# Patient Record
Sex: FEMALE | Race: WHITE | Employment: FULL TIME | URBAN - METROPOLITAN AREA
[De-identification: names, ages, dates, MRNs, and addresses within clinical notes are randomized per-mention and may not be internally consistent; named-entity substitution may affect disease eponyms.]

---

## 2017-12-13 ENCOUNTER — APPOINTMENT (OUTPATIENT)
Dept: GENERAL RADIOLOGY | Age: 67
End: 2017-12-13
Payer: MEDICARE

## 2017-12-13 ENCOUNTER — HOSPITAL ENCOUNTER (EMERGENCY)
Age: 67
Discharge: HOME | End: 2017-12-13
Payer: MEDICARE

## 2017-12-13 VITALS
OXYGEN SATURATION: 98 % | SYSTOLIC BLOOD PRESSURE: 136 MMHG | HEIGHT: 61 IN | RESPIRATION RATE: 8 BRPM | TEMPERATURE: 97.1 F | DIASTOLIC BLOOD PRESSURE: 70 MMHG | BODY MASS INDEX: 22.28 KG/M2 | HEART RATE: 73 BPM | WEIGHT: 118 LBS

## 2017-12-13 DIAGNOSIS — S62.101A RIGHT WRIST FRACTURE, CLOSED, INITIAL ENCOUNTER: Primary | ICD-10-CM

## 2017-12-13 PROCEDURE — 73100 X-RAY EXAM OF WRIST: CPT

## 2017-12-13 PROCEDURE — 99283 EMERGENCY DEPT VISIT LOW MDM: CPT

## 2017-12-13 PROCEDURE — 73130 X-RAY EXAM OF HAND: CPT

## 2017-12-13 PROCEDURE — 6370000000 HC RX 637 (ALT 250 FOR IP): Performed by: PHYSICIAN ASSISTANT

## 2017-12-13 PROCEDURE — 29125 APPL SHORT ARM SPLINT STATIC: CPT

## 2017-12-13 RX ORDER — CHLORZOXAZONE 500 MG/1
500 TABLET ORAL 3 TIMES DAILY PRN
Qty: 20 TABLET | Refills: 0 | Status: SHIPPED | OUTPATIENT
Start: 2017-12-13 | End: 2017-12-13 | Stop reason: ALTCHOICE

## 2017-12-13 RX ORDER — ACETAMINOPHEN 325 MG/1
650 TABLET ORAL ONCE
Status: COMPLETED | OUTPATIENT
Start: 2017-12-13 | End: 2017-12-13

## 2017-12-13 RX ORDER — PREDNISONE 10 MG/1
10 TABLET ORAL DAILY
Qty: 21 TABLET | Refills: 0 | Status: SHIPPED | OUTPATIENT
Start: 2017-12-13 | End: 2017-12-13 | Stop reason: ALTCHOICE

## 2017-12-13 RX ORDER — HYDROCODONE BITARTRATE AND ACETAMINOPHEN 5; 325 MG/1; MG/1
1 TABLET ORAL EVERY 8 HOURS PRN
Qty: 13 TABLET | Refills: 0 | Status: SHIPPED | OUTPATIENT
Start: 2017-12-13 | End: 2017-12-20

## 2017-12-13 RX ADMIN — ACETAMINOPHEN 650 MG: 325 TABLET ORAL at 14:52

## 2017-12-13 ASSESSMENT — PAIN DESCRIPTION - LOCATION: LOCATION: WRIST

## 2017-12-13 ASSESSMENT — PAIN DESCRIPTION - ORIENTATION: ORIENTATION: RIGHT

## 2017-12-13 ASSESSMENT — ENCOUNTER SYMPTOMS
GASTROINTESTINAL NEGATIVE: 1
RESPIRATORY NEGATIVE: 1
EYES NEGATIVE: 1

## 2017-12-13 ASSESSMENT — PAIN SCALES - GENERAL
PAINLEVEL_OUTOF10: 5
PAINLEVEL_OUTOF10: 5

## 2017-12-13 NOTE — ED PROVIDER NOTES
[facility] ED  eMERGENCY dEPARTMENT eNCOUnter      Pt Name: Darius Pozo  MRN: [id number]  [place] 1950  Date of evaluation: 12/13/2017  Provider: Mukesh Julio PA-C      HISTORY OF PRESENT ILLNESS    Darius Pozo is a 79 y.o. female who presents to the Emergency Department with Chief complaint of right wrist pain. Patient states she was hanging up [place] decorations and fell landing on her right wrist.  Patient states it just happened prior to arrival.  She has not taken any medication prior to arrival.  She denies any head or neck injury and has no other complaints at this time. She denies any hip or low back pain. REVIEW OF SYSTEMS       Review of Systems   Constitutional: Negative. HENT: Negative. Eyes: Negative. Respiratory: Negative. Cardiovascular: Negative. Gastrointestinal: Negative. Endocrine: Negative. Genitourinary: Negative. Musculoskeletal: Positive for arthralgias. Right hand and right wrist   Skin: Negative. Neurological: Negative. Psychiatric/Behavioral: Negative. PAST MEDICAL HISTORY   History reviewed. No pertinent past medical history. SURGICAL HISTORY     History reviewed. No pertinent surgical history. CURRENT MEDICATIONS       Previous Medications    No medications on file       ALLERGIES     Aspirin    FAMILY HISTORY     History reviewed. No pertinent family history.        SOCIAL HISTORY       Social History     Social History    Marital status:      Spouse name: N/A    Number of children: N/A    Years of education: N/A     Social History Main Topics    Smoking status: Never Smoker    Smokeless tobacco: None    Alcohol use Yes      Comment: occasional    Drug use: No    Sexual activity: Not Asked     Other Topics Concern    None     Social History Narrative    None       SCREENINGS             PHYSICAL EXAM    (up to 7 for level 4, 8 or more for level 5)     ED Triage Vitals

## 2017-12-13 NOTE — ED TRIAGE NOTES
Pt fell off a step ladder and injured right wrist.  Swelling and deformity noted on arrival. Skin cool but color wnl

## 2018-01-08 ENCOUNTER — ANESTHESIA (OUTPATIENT)
Dept: OPERATING ROOM | Age: 68
End: 2018-01-08
Payer: MEDICARE

## 2018-01-08 ENCOUNTER — HOSPITAL ENCOUNTER (OUTPATIENT)
Age: 68
Setting detail: OUTPATIENT SURGERY
Discharge: HOME | End: 2018-01-08
Attending: ORTHOPAEDIC SURGERY | Admitting: ORTHOPAEDIC SURGERY
Payer: MEDICARE

## 2018-01-08 ENCOUNTER — ANESTHESIA EVENT (OUTPATIENT)
Dept: OPERATING ROOM | Age: 68
End: 2018-01-08
Payer: MEDICARE

## 2018-01-08 ENCOUNTER — HOSPITAL ENCOUNTER (OUTPATIENT)
Dept: GENERAL RADIOLOGY | Age: 68
Setting detail: OUTPATIENT SURGERY
Discharge: HOME | End: 2018-01-08
Attending: ORTHOPAEDIC SURGERY
Payer: MEDICARE

## 2018-01-08 VITALS
WEIGHT: 120 LBS | DIASTOLIC BLOOD PRESSURE: 66 MMHG | BODY MASS INDEX: 23.56 KG/M2 | OXYGEN SATURATION: 99 % | TEMPERATURE: 97.6 F | SYSTOLIC BLOOD PRESSURE: 100 MMHG | HEART RATE: 66 BPM | RESPIRATION RATE: 16 BRPM | HEIGHT: 60 IN

## 2018-01-08 VITALS — OXYGEN SATURATION: 100 % | DIASTOLIC BLOOD PRESSURE: 93 MMHG | SYSTOLIC BLOOD PRESSURE: 165 MMHG

## 2018-01-08 DIAGNOSIS — R52 PAIN: ICD-10-CM

## 2018-01-08 DIAGNOSIS — S52.571P OTHER CLOSED INTRA-ARTICULAR FRACTURE OF DISTAL END OF RIGHT RADIUS WITH MALUNION, SUBSEQUENT ENCOUNTER: Primary | ICD-10-CM

## 2018-01-08 PROBLEM — S52.501P CLOSED FRACTURE OF DISTAL END OF RIGHT RADIUS WITH MALUNION: Status: ACTIVE | Noted: 2018-01-08

## 2018-01-08 LAB
BASOPHILS ABSOLUTE: 0 K/UL (ref 0–0.2)
BASOPHILS RELATIVE PERCENT: 0.7 %
EKG ATRIAL RATE: 68 BPM
EKG P AXIS: 65 DEGREES
EKG P-R INTERVAL: 150 MS
EKG Q-T INTERVAL: 402 MS
EKG QRS DURATION: 74 MS
EKG QTC CALCULATION (BAZETT): 427 MS
EKG R AXIS: 23 DEGREES
EKG T AXIS: 32 DEGREES
EKG VENTRICULAR RATE: 68 BPM
EOSINOPHILS ABSOLUTE: 0.1 K/UL (ref 0–0.7)
EOSINOPHILS RELATIVE PERCENT: 1 %
HCT VFR BLD CALC: 42.1 % (ref 37–47)
HEMOGLOBIN: 14.1 G/DL (ref 12–16)
LYMPHOCYTES ABSOLUTE: 1.9 K/UL (ref 1–4.8)
LYMPHOCYTES RELATIVE PERCENT: 32.3 %
MCH RBC QN AUTO: 32.3 PG (ref 27–31.3)
MCHC RBC AUTO-ENTMCNC: 33.6 % (ref 33–37)
MCV RBC AUTO: 96.3 FL (ref 82–100)
MONOCYTES ABSOLUTE: 0.5 K/UL (ref 0.2–0.8)
MONOCYTES RELATIVE PERCENT: 9.1 %
NEUTROPHILS ABSOLUTE: 3.4 K/UL (ref 1.4–6.5)
NEUTROPHILS RELATIVE PERCENT: 56.9 %
PDW BLD-RTO: 14.2 % (ref 11.5–14.5)
PLATELET # BLD: 272 K/UL (ref 130–400)
RBC # BLD: 4.37 M/UL (ref 4.2–5.4)
SPECIMEN STATUS: NORMAL
WBC # BLD: 5.9 K/UL (ref 4.8–10.8)

## 2018-01-08 PROCEDURE — 85025 COMPLETE CBC W/AUTO DIFF WBC: CPT

## 2018-01-08 PROCEDURE — 7100000001 HC PACU RECOVERY - ADDTL 15 MIN: Performed by: ORTHOPAEDIC SURGERY

## 2018-01-08 PROCEDURE — 3700000001 HC ADD 15 MINUTES (ANESTHESIA): Performed by: ORTHOPAEDIC SURGERY

## 2018-01-08 PROCEDURE — C1769 GUIDE WIRE: HCPCS | Performed by: ORTHOPAEDIC SURGERY

## 2018-01-08 PROCEDURE — 2580000003 HC RX 258: Performed by: ORTHOPAEDIC SURGERY

## 2018-01-08 PROCEDURE — 3600000004 HC SURGERY LEVEL 4 BASE: Performed by: ORTHOPAEDIC SURGERY

## 2018-01-08 PROCEDURE — 3209999900 FLUORO FOR SURGICAL PROCEDURES

## 2018-01-08 PROCEDURE — 93005 ELECTROCARDIOGRAM TRACING: CPT

## 2018-01-08 PROCEDURE — 64417 NJX AA&/STRD AX NERVE IMG: CPT | Performed by: STUDENT IN AN ORGANIZED HEALTH CARE EDUCATION/TRAINING PROGRAM

## 2018-01-08 PROCEDURE — 93010 ELECTROCARDIOGRAM REPORT: CPT | Performed by: INTERNAL MEDICINE

## 2018-01-08 PROCEDURE — 6360000002 HC RX W HCPCS: Performed by: STUDENT IN AN ORGANIZED HEALTH CARE EDUCATION/TRAINING PROGRAM

## 2018-01-08 PROCEDURE — 7100000010 HC PHASE II RECOVERY - FIRST 15 MIN: Performed by: ORTHOPAEDIC SURGERY

## 2018-01-08 PROCEDURE — 2580000003 HC RX 258: Performed by: STUDENT IN AN ORGANIZED HEALTH CARE EDUCATION/TRAINING PROGRAM

## 2018-01-08 PROCEDURE — 3700000000 HC ANESTHESIA ATTENDED CARE: Performed by: ORTHOPAEDIC SURGERY

## 2018-01-08 PROCEDURE — 3600000014 HC SURGERY LEVEL 4 ADDTL 15MIN: Performed by: ORTHOPAEDIC SURGERY

## 2018-01-08 PROCEDURE — 2500000003 HC RX 250 WO HCPCS: Performed by: NURSE ANESTHETIST, CERTIFIED REGISTERED

## 2018-01-08 PROCEDURE — 7100000011 HC PHASE II RECOVERY - ADDTL 15 MIN: Performed by: ORTHOPAEDIC SURGERY

## 2018-01-08 PROCEDURE — 6360000002 HC RX W HCPCS: Performed by: ORTHOPAEDIC SURGERY

## 2018-01-08 PROCEDURE — 7100000000 HC PACU RECOVERY - FIRST 15 MIN: Performed by: ORTHOPAEDIC SURGERY

## 2018-01-08 PROCEDURE — 2720000010 HC SURG SUPPLY STERILE: Performed by: ORTHOPAEDIC SURGERY

## 2018-01-08 PROCEDURE — C1713 ANCHOR/SCREW BN/BN,TIS/BN: HCPCS | Performed by: ORTHOPAEDIC SURGERY

## 2018-01-08 PROCEDURE — 6360000002 HC RX W HCPCS: Performed by: NURSE ANESTHETIST, CERTIFIED REGISTERED

## 2018-01-08 DEVICE — 2.3MM X 16MM LOCKING CORTICAL SCREW
Type: IMPLANTABLE DEVICE | Site: WRIST | Status: FUNCTIONAL
Brand: ACUMED

## 2018-01-08 DEVICE — GRAFT BNE SUB 5ML 1.7-10MM CANC CHIP MORSELIZED FRZ DRY: Type: IMPLANTABLE DEVICE | Site: WRIST | Status: FUNCTIONAL

## 2018-01-08 DEVICE — 3.5MM X 14MM LOCKING HEXALOBE SCREW
Type: IMPLANTABLE DEVICE | Site: WRIST | Status: FUNCTIONAL
Brand: ACUMED

## 2018-01-08 DEVICE — 2.3MM X 18MM LOCKING CORTICAL SCREW
Type: IMPLANTABLE DEVICE | Site: WRIST | Status: FUNCTIONAL
Brand: ACUMED

## 2018-01-08 RX ORDER — DIPHENHYDRAMINE HYDROCHLORIDE 50 MG/ML
12.5 INJECTION INTRAMUSCULAR; INTRAVENOUS
Status: DISCONTINUED | OUTPATIENT
Start: 2018-01-08 | End: 2018-01-08 | Stop reason: HOSPADM

## 2018-01-08 RX ORDER — ONDANSETRON 2 MG/ML
INJECTION INTRAMUSCULAR; INTRAVENOUS PRN
Status: DISCONTINUED | OUTPATIENT
Start: 2018-01-08 | End: 2018-01-08 | Stop reason: SDUPTHER

## 2018-01-08 RX ORDER — FENTANYL CITRATE 50 UG/ML
50 INJECTION, SOLUTION INTRAMUSCULAR; INTRAVENOUS EVERY 10 MIN PRN
Status: DISCONTINUED | OUTPATIENT
Start: 2018-01-08 | End: 2018-01-08 | Stop reason: HOSPADM

## 2018-01-08 RX ORDER — LIDOCAINE HYDROCHLORIDE 10 MG/ML
1 INJECTION, SOLUTION EPIDURAL; INFILTRATION; INTRACAUDAL; PERINEURAL ONCE
Status: DISCONTINUED | OUTPATIENT
Start: 2018-01-08 | End: 2018-01-08 | Stop reason: HOSPADM

## 2018-01-08 RX ORDER — ROPIVACAINE HYDROCHLORIDE 5 MG/ML
INJECTION, SOLUTION EPIDURAL; INFILTRATION; PERINEURAL PRN
Status: DISCONTINUED | OUTPATIENT
Start: 2018-01-08 | End: 2018-01-08 | Stop reason: SDUPTHER

## 2018-01-08 RX ORDER — ONDANSETRON 2 MG/ML
4 INJECTION INTRAMUSCULAR; INTRAVENOUS
Status: DISCONTINUED | OUTPATIENT
Start: 2018-01-08 | End: 2018-01-08 | Stop reason: HOSPADM

## 2018-01-08 RX ORDER — HYDROCODONE BITARTRATE AND ACETAMINOPHEN 5; 325 MG/1; MG/1
1 TABLET ORAL EVERY 6 HOURS PRN
Qty: 20 TABLET | Refills: 0 | Status: SHIPPED | OUTPATIENT
Start: 2018-01-08 | End: 2018-01-13

## 2018-01-08 RX ORDER — DEXAMETHASONE SODIUM PHOSPHATE 10 MG/ML
INJECTION INTRAMUSCULAR; INTRAVENOUS PRN
Status: DISCONTINUED | OUTPATIENT
Start: 2018-01-08 | End: 2018-01-08 | Stop reason: SDUPTHER

## 2018-01-08 RX ORDER — MIDAZOLAM HYDROCHLORIDE 1 MG/ML
INJECTION INTRAMUSCULAR; INTRAVENOUS PRN
Status: DISCONTINUED | OUTPATIENT
Start: 2018-01-08 | End: 2018-01-08 | Stop reason: SDUPTHER

## 2018-01-08 RX ORDER — PROPOFOL 10 MG/ML
INJECTION, EMULSION INTRAVENOUS PRN
Status: DISCONTINUED | OUTPATIENT
Start: 2018-01-08 | End: 2018-01-08 | Stop reason: SDUPTHER

## 2018-01-08 RX ORDER — HYDROCODONE BITARTRATE AND ACETAMINOPHEN 5; 325 MG/1; MG/1
1 TABLET ORAL PRN
Status: DISCONTINUED | OUTPATIENT
Start: 2018-01-08 | End: 2018-01-08 | Stop reason: HOSPADM

## 2018-01-08 RX ORDER — LIDOCAINE HYDROCHLORIDE 20 MG/ML
INJECTION, SOLUTION INFILTRATION; PERINEURAL PRN
Status: DISCONTINUED | OUTPATIENT
Start: 2018-01-08 | End: 2018-01-08 | Stop reason: SDUPTHER

## 2018-01-08 RX ORDER — METOCLOPRAMIDE HYDROCHLORIDE 5 MG/ML
10 INJECTION INTRAMUSCULAR; INTRAVENOUS
Status: DISCONTINUED | OUTPATIENT
Start: 2018-01-08 | End: 2018-01-08 | Stop reason: HOSPADM

## 2018-01-08 RX ORDER — MEPERIDINE HYDROCHLORIDE 25 MG/ML
12.5 INJECTION INTRAMUSCULAR; INTRAVENOUS; SUBCUTANEOUS EVERY 5 MIN PRN
Status: DISCONTINUED | OUTPATIENT
Start: 2018-01-08 | End: 2018-01-08 | Stop reason: HOSPADM

## 2018-01-08 RX ORDER — SODIUM CHLORIDE, SODIUM LACTATE, POTASSIUM CHLORIDE, CALCIUM CHLORIDE 600; 310; 30; 20 MG/100ML; MG/100ML; MG/100ML; MG/100ML
INJECTION, SOLUTION INTRAVENOUS CONTINUOUS
Status: DISCONTINUED | OUTPATIENT
Start: 2018-01-08 | End: 2018-01-08 | Stop reason: HOSPADM

## 2018-01-08 RX ORDER — HYDROCODONE BITARTRATE AND ACETAMINOPHEN 5; 325 MG/1; MG/1
2 TABLET ORAL PRN
Status: DISCONTINUED | OUTPATIENT
Start: 2018-01-08 | End: 2018-01-08 | Stop reason: HOSPADM

## 2018-01-08 RX ORDER — MAGNESIUM HYDROXIDE 1200 MG/15ML
LIQUID ORAL CONTINUOUS PRN
Status: DISCONTINUED | OUTPATIENT
Start: 2018-01-08 | End: 2018-01-08 | Stop reason: HOSPADM

## 2018-01-08 RX ORDER — GLYCOPYRROLATE 0.2 MG/ML
INJECTION INTRAMUSCULAR; INTRAVENOUS PRN
Status: DISCONTINUED | OUTPATIENT
Start: 2018-01-08 | End: 2018-01-08 | Stop reason: SDUPTHER

## 2018-01-08 RX ADMIN — PHENYLEPHRINE HYDROCHLORIDE 100 MCG: 10 INJECTION INTRAVENOUS at 11:08

## 2018-01-08 RX ADMIN — PHENYLEPHRINE HYDROCHLORIDE 100 MCG: 10 INJECTION INTRAVENOUS at 11:15

## 2018-01-08 RX ADMIN — DEXAMETHASONE SODIUM PHOSPHATE 10 MG: 10 INJECTION INTRAMUSCULAR; INTRAVENOUS at 10:38

## 2018-01-08 RX ADMIN — ONDANSETRON 4 MG: 2 INJECTION INTRAMUSCULAR; INTRAVENOUS at 10:38

## 2018-01-08 RX ADMIN — PHENYLEPHRINE HYDROCHLORIDE 100 MCG: 10 INJECTION INTRAVENOUS at 10:55

## 2018-01-08 RX ADMIN — PHENYLEPHRINE HYDROCHLORIDE 100 MCG: 10 INJECTION INTRAVENOUS at 11:25

## 2018-01-08 RX ADMIN — PHENYLEPHRINE HYDROCHLORIDE 100 MCG: 10 INJECTION INTRAVENOUS at 12:35

## 2018-01-08 RX ADMIN — CEFAZOLIN SODIUM 2 G: 2 SOLUTION INTRAVENOUS at 10:34

## 2018-01-08 RX ADMIN — PHENYLEPHRINE HYDROCHLORIDE 100 MCG: 10 INJECTION INTRAVENOUS at 11:45

## 2018-01-08 RX ADMIN — PHENYLEPHRINE HYDROCHLORIDE 100 MCG: 10 INJECTION INTRAVENOUS at 11:56

## 2018-01-08 RX ADMIN — PHENYLEPHRINE HYDROCHLORIDE 100 MCG: 10 INJECTION INTRAVENOUS at 13:25

## 2018-01-08 RX ADMIN — PROPOFOL 100 MG: 10 INJECTION, EMULSION INTRAVENOUS at 10:24

## 2018-01-08 RX ADMIN — LIDOCAINE HYDROCHLORIDE 50 MG: 20 INJECTION, SOLUTION INFILTRATION; PERINEURAL at 10:24

## 2018-01-08 RX ADMIN — PHENYLEPHRINE HYDROCHLORIDE 100 MCG: 10 INJECTION INTRAVENOUS at 12:10

## 2018-01-08 RX ADMIN — ROPIVACAINE HYDROCHLORIDE 30 ML: 5 INJECTION, SOLUTION EPIDURAL; INFILTRATION; PERINEURAL at 09:17

## 2018-01-08 RX ADMIN — PHENYLEPHRINE HYDROCHLORIDE 100 MCG: 10 INJECTION INTRAVENOUS at 12:55

## 2018-01-08 RX ADMIN — GLYCOPYRROLATE 0.2 MG: 0.2 INJECTION INTRAMUSCULAR; INTRAVENOUS at 10:53

## 2018-01-08 RX ADMIN — PHENYLEPHRINE HYDROCHLORIDE 100 MCG: 10 INJECTION INTRAVENOUS at 12:25

## 2018-01-08 RX ADMIN — SODIUM CHLORIDE, POTASSIUM CHLORIDE, SODIUM LACTATE AND CALCIUM CHLORIDE: 600; 310; 30; 20 INJECTION, SOLUTION INTRAVENOUS at 10:23

## 2018-01-08 RX ADMIN — MIDAZOLAM HYDROCHLORIDE 5 MG: 1 INJECTION, SOLUTION INTRAMUSCULAR; INTRAVENOUS at 09:13

## 2018-01-08 ASSESSMENT — PULMONARY FUNCTION TESTS
PIF_VALUE: 14
PIF_VALUE: 2
PIF_VALUE: 14
PIF_VALUE: 14
PIF_VALUE: 15
PIF_VALUE: 14
PIF_VALUE: 2
PIF_VALUE: 14
PIF_VALUE: 14
PIF_VALUE: 6
PIF_VALUE: 5
PIF_VALUE: 3
PIF_VALUE: 5
PIF_VALUE: 14
PIF_VALUE: 6
PIF_VALUE: 14
PIF_VALUE: 2
PIF_VALUE: 14
PIF_VALUE: 12
PIF_VALUE: 14
PIF_VALUE: 2
PIF_VALUE: 14
PIF_VALUE: 9
PIF_VALUE: 14
PIF_VALUE: 1
PIF_VALUE: 14
PIF_VALUE: 0
PIF_VALUE: 14
PIF_VALUE: 2
PIF_VALUE: 14
PIF_VALUE: 10
PIF_VALUE: 14
PIF_VALUE: 2
PIF_VALUE: 2
PIF_VALUE: 14
PIF_VALUE: 6
PIF_VALUE: 14
PIF_VALUE: 4
PIF_VALUE: 14
PIF_VALUE: 14
PIF_VALUE: 2
PIF_VALUE: 14
PIF_VALUE: 14
PIF_VALUE: 5
PIF_VALUE: 14
PIF_VALUE: 0
PIF_VALUE: 12
PIF_VALUE: 14
PIF_VALUE: 6
PIF_VALUE: 14
PIF_VALUE: 2
PIF_VALUE: 14
PIF_VALUE: 2
PIF_VALUE: 12
PIF_VALUE: 14
PIF_VALUE: 2
PIF_VALUE: 14
PIF_VALUE: 5
PIF_VALUE: 14
PIF_VALUE: 6
PIF_VALUE: 14
PIF_VALUE: 2
PIF_VALUE: 14
PIF_VALUE: 2
PIF_VALUE: 14
PIF_VALUE: 2
PIF_VALUE: 14
PIF_VALUE: 2
PIF_VALUE: 1
PIF_VALUE: 14
PIF_VALUE: 2
PIF_VALUE: 14
PIF_VALUE: 12
PIF_VALUE: 14
PIF_VALUE: 9
PIF_VALUE: 14
PIF_VALUE: 14
PIF_VALUE: 12
PIF_VALUE: 14
PIF_VALUE: 2
PIF_VALUE: 14
PIF_VALUE: 9
PIF_VALUE: 14
PIF_VALUE: 2
PIF_VALUE: 14

## 2018-01-08 ASSESSMENT — PAIN SCALES - GENERAL
PAINLEVEL_OUTOF10: 0
PAINLEVEL_OUTOF10: 0

## 2018-01-08 ASSESSMENT — PAIN - FUNCTIONAL ASSESSMENT: PAIN_FUNCTIONAL_ASSESSMENT: 0-10

## 2018-01-08 NOTE — PROGRESS NOTES
0900-A time out was called and Dr. Linda Michele marked the right arm and performed a block she was given a sedative per Dr. Linda Michele . Placed on the monitor and oxygen prior to the procedure.

## 2018-01-08 NOTE — ANESTHESIA PRE PROCEDURE
Encounters:   01/08/18 120 lb (54.4 kg)   12/13/17 118 lb (53.5 kg)     Body mass index is 23.44 kg/m². CBC:   Lab Results   Component Value Date    WBC 5.9 01/08/2018    RBC 4.37 01/08/2018    HGB 14.1 01/08/2018    HCT 42.1 01/08/2018    MCV 96.3 01/08/2018    RDW 14.2 01/08/2018     01/08/2018       CMP: No results found for: NA, K, CL, CO2, BUN, CREATININE, GFRAA, AGRATIO, LABGLOM, GLUCOSE, PROT, CALCIUM, BILITOT, ALKPHOS, AST, ALT    POC Tests: No results for input(s): POCGLU, POCNA, POCK, POCCL, POCBUN, POCHEMO, POCHCT in the last 72 hours. Coags: No results found for: PROTIME, INR, APTT    HCG (If Applicable): No results found for: PREGTESTUR, PREGSERUM, HCG, HCGQUANT     ABGs: No results found for: PHART, PO2ART, AIS2QDJ, TII3CVR, BEART, Z4VACUGB     Type & Screen (If Applicable):  No results found for: LABABO, [address]    Anesthesia Evaluation  Patient summary reviewed and Nursing notes reviewed no history of anesthetic complications:   Airway: Mallampati: II  TM distance: >3 FB   Neck ROM: full  Mouth opening: > = 3 FB Dental: normal exam         Pulmonary:Negative Pulmonary ROS and normal exam  breath sounds clear to auscultation                             Cardiovascular:Negative CV ROS  Exercise tolerance: good (>4 METS),         ECG reviewed  Rhythm: regular  Rate: normal           Beta Blocker:  Not on Beta Blocker      ROS comment: Normal sinus rhythm  Normal ECG  No previous ECGs available     Neuro/Psych:   Negative Neuro/Psych ROS              GI/Hepatic/Renal: Neg GI/Hepatic/Renal ROS            Endo/Other: Negative Endo/Other ROS             Pt had PAT visit. Abdominal:           Vascular:                                      Anesthesia Plan      regional and general     ASA 1     (LMA  Axillary nerve block with US)  Induction: intravenous. MIPS: Prophylactic antiemetics administered. Anesthetic plan and risks discussed with patient.       Plan discussed with

## 2018-01-08 NOTE — OP NOTE
Dissected sharply through skin and Bovie electrocautery as used for hemostasis. The EPL tendon was identified proximally and distally and the retinaculum was opened along its course. The second and fourth compartments were then elevated in a subperiosteal fashion off of the dorsolateral the distal radius. There was a significant amount of comminution as well as callus formation present. The fracture fragments were mobilized. Length and tilt appeared to be adequately restored and an appropriate plate was selected and placed. The plate was secured proximally with a cortical screw and multiple fluoroscopic views confirmed maintain reduction and appropriate hardware placement. Screws were placed distally in the ulnar and radial fragments. The radial screws really did not capture the styloid adequately and was persistently widened both visually and on x-ray. I elected to place a radial plate and this was secured into appropriate position area did cortical screw was placed proximally to bring the plate down to the bone and a locking screw was placed distally to maintain it along the styloid. Additional cortical and locking screws were placed proximally. Multiple fluoroscopic views confirmed appropriate plate position and maintained reduction. There is no crepitus with passive range of motion of the wrist.  No laxity of the distal radial ulnar joint was appreciated. The wounds were copiously irrigated with normal saline. The tourniquet was deflated and Bovie electrocautery was used for hemostasis. There was some oozing near the radial artery and pressure was held for several minutes and there was adequate hemostasis following pressure. The wound was again irrigated. The retinaculum was loosely closed with 3-0 Vicryl suture. The EPL tendon was left transposed outside of the retinaculum.   The subcutaneous tissue was closed with 2-0 Vicryl and the skin was closed with 4-0 nylon suture in an interrupted

## 2018-02-26 ENCOUNTER — HOSPITAL ENCOUNTER (OUTPATIENT)
Dept: OCCUPATIONAL THERAPY | Age: 68
Setting detail: THERAPIES SERIES
Discharge: HOME | End: 2018-02-26
Payer: MEDICARE

## 2018-02-26 PROCEDURE — G8988 SELF CARE GOAL STATUS: HCPCS

## 2018-02-26 PROCEDURE — 97165 OT EVAL LOW COMPLEX 30 MIN: CPT

## 2018-02-26 PROCEDURE — G8987 SELF CARE CURRENT STATUS: HCPCS

## 2018-02-26 PROCEDURE — 97530 THERAPEUTIC ACTIVITIES: CPT

## 2018-02-28 ENCOUNTER — HOSPITAL ENCOUNTER (OUTPATIENT)
Dept: OCCUPATIONAL THERAPY | Age: 68
Setting detail: THERAPIES SERIES
Discharge: HOME | End: 2018-02-28
Payer: MEDICARE

## 2018-02-28 PROCEDURE — 97140 MANUAL THERAPY 1/> REGIONS: CPT

## 2018-02-28 PROCEDURE — 97530 THERAPEUTIC ACTIVITIES: CPT

## 2018-03-05 ENCOUNTER — HOSPITAL ENCOUNTER (OUTPATIENT)
Dept: OCCUPATIONAL THERAPY | Age: 68
Setting detail: THERAPIES SERIES
Discharge: HOME | End: 2018-03-05
Payer: MEDICARE

## 2018-03-05 PROCEDURE — 97140 MANUAL THERAPY 1/> REGIONS: CPT

## 2018-03-05 PROCEDURE — 97530 THERAPEUTIC ACTIVITIES: CPT

## 2018-03-07 ENCOUNTER — HOSPITAL ENCOUNTER (OUTPATIENT)
Dept: OCCUPATIONAL THERAPY | Age: 68
Setting detail: THERAPIES SERIES
Discharge: HOME | End: 2018-03-07
Payer: MEDICARE

## 2018-03-07 PROCEDURE — 97530 THERAPEUTIC ACTIVITIES: CPT

## 2018-03-07 PROCEDURE — 97140 MANUAL THERAPY 1/> REGIONS: CPT

## 2018-03-07 NOTE — PROGRESS NOTES
Occupational Therapy  Daily Treatment Note  Date: 3/7/2018  Patient Name: Lyndon Tamayo  :  1950  MRN: [id number]       Subjective   OT Visit Information  Onset Date: 17  OT Insurance Information: Medicare   Progress Note Counter: 3 after evaluation  Subjective  Subjective: Pt reports continued pain of right thumb, index and small fingers. Pt states  \"the wrist is fine, it's the thumb that's really giving me trouble\" re: pain      Treatment Activities:  Pt completed PROM to right wrist and hand during 20 minute heat treatment. Pt reports completion of recommended home exercises. Pt states' \" I'm trying to do more with it\" re: her dominant right hand. Direct treatment of manual techniques completed. Techniques included light arm pull, transverse planes and deep releases at right forearm and wrist.  PROM completed for right forearm supination and pronation. Pt denied pain at the end of treatment. Pt to add one task to existing HEP. Bilateral clasp during wrist flexion and /.  Pt demonstrated correct execution. Modalities: Yes  Fluidotherapy  Number Minutes Fluidotherapy: 20  Fluidotherapy Location: Right;Left;Hand;Wrist.  Pt completed passive, right hand and wrist movement using non dominant left hand. Hand Dominance  Hand Dominance: Right      Assessment Pt tolerated therapy interventions well. Pt reports continued pain of lateral aspect of right hand indicating level of CMC. Pt also reports discomfort of right 2nd and 5th digits. Pt reports completion of recommended home exercises. Pt continues to exhibit impaired right forearm and wrist active and passive movement. Pt unable to complete composite finger flexion with index flexion impaired. Pt reports attempting to use right hand more. Post Treatment Pain Screening  Pain at present: 0  Scale Used:  Faces  Comments / Details: OT recommends icing of right wrist and hand if pain occurs         Plan Continue with established

## 2018-03-12 ENCOUNTER — HOSPITAL ENCOUNTER (OUTPATIENT)
Dept: OCCUPATIONAL THERAPY | Age: 68
Setting detail: THERAPIES SERIES
Discharge: HOME | End: 2018-03-12
Payer: MEDICARE

## 2018-03-12 PROCEDURE — 97530 THERAPEUTIC ACTIVITIES: CPT

## 2018-03-12 NOTE — PROGRESS NOTES
remained in place until suggested day of removal (2 days). Pt denied experiencing irritation at area. Pt reports tape was \"difficult to remove\"  OT recommends saturating tape with baby oil prior to removal.  Pt reports understanding information discussed. Taping applied to lateral aspect of right wrist and hand. An I strip was applied horizontally at the level of the styloids. 25-30 % tension for I strip. A strip with Y tails applied with anchor over thumb MP (thumb MP in flexion when tape placed) 25 % stretch applied. Y tails secured at level of APL and EPB. OT reiterated precautions ie: removal of tape if any signs of irritation. Pt reports understanding information discussed. Light hand strengthening initiated today. Pt was provided light resistance (pink foam sponge) Pt tolerated 10 reps without increased pain. Pt to add to current HEP, completing 10 reps 1 set 2-3 x a day as she can tolerate. Pt to slowly add sets of 10 until 30 reps total completed. Pt reported 2-3/10 pain at the end of treatment session. OT and pt discussed pt's hobbies. Pt reports she has completed yolande in the past.  OT recommends attempting an easy pattern or stitch for short periods of time. Pt receptive to recommendation. Other exercises  Other exercises?: Yes  Other exercises 1: AROM wire 4 x over and back   Other exercises  2: cutting of paper with regular and spring loaded scissors.  10 x    RUE AROM (degrees)  RUE AROM : Exceptions  R Forearm Pron 0-90: 0-90  R Forearm Supination  0-90: 0-45  R Wrist Flexion 0-80: 0-18  R Wrist Extension 0-70: 0-22  Right Hand AROM (degrees)  Right Hand AROM: Exceptions  R Index  MCP 0-90: -5 to 80  R Index PIP 0-100: 0 to 80  R Index DIP 0-70: 0 to 50  R Long  MCP 0-90: 0 to 80  R Long PIP 0-100: -15  to 90  R Long DIP 0-70: -5  to 45  R Ring  MCP 0-90: 0 to 80  R Ring PIP 0-100: -10 to 95  R Ring DIP 0-70: 0 to 45  R Little  MCP 0-90: 0 to 80  R Little PIP 0-100: -17 to Individual Concurrent Group Co-treatment   Time In  2545         Time Out  1100         Minutes  39                 Hellen Mcintyre, OTR/L Electronically signed by Brenden Thomas OTR/L on 3/12/18 at 4:04 PM

## 2018-03-14 ENCOUNTER — HOSPITAL ENCOUNTER (OUTPATIENT)
Dept: OCCUPATIONAL THERAPY | Age: 68
Setting detail: THERAPIES SERIES
Discharge: HOME | End: 2018-03-14
Payer: MEDICARE

## 2018-03-14 PROCEDURE — 97530 THERAPEUTIC ACTIVITIES: CPT

## 2018-03-14 PROCEDURE — 97140 MANUAL THERAPY 1/> REGIONS: CPT

## 2018-03-14 NOTE — PROGRESS NOTES
Occupational Therapy  Daily Treatment Note  Date: 3/14/2018  Patient Name: Norbert Gamboa  :  1950  MRN: [id number]       Subjective   OT Visit Information  Progress Note Counter: 5 after evaluation  Subjective  Subjective: Pt reports continued pain of right hand, lateral aspect  Patient Observation  Observations: Pt stated, \"I showed my  I can do this now'\" Pt indicating supination of right. Pt also reports \"I've been doing a lot of movements, like when I'm doing the dishes and scrubbing pans. \"   Treatment Activities:  Manual techniques completed with pt in supine. Techniques included transverse planes at right forearm and wrist and light distraction at forearm and wrist.  Pt's wrist passive flexion and / measured. Pt with 25 degrees passive / and 20 degrees passive flexion. OT discussed use of SuperSportt system for right wrist movement. Pt reports she has an appointment with Dr Danyelle Whitfield on . OT to provide request for prescription form to pt at next visit. Pt participated in therapeutic exercise with right hand and wrist.  BTE initiated today. Pt denied pain at the end of treatment. Pt reports concern regarding decreased thumb extension and abduction. Pt exhibits what appears to be atrophy of her thenar eminence. OT provided 2 additional tasks for exercise sponge use. Other exercises  Other exercises?: Yes  Other exercises 1: AROM wire 3x over and back   Other exercises 2:  3rd handle, no resist, 10x  Other exercises 3:  10 x no added resistance   Other exercises 4:  10 x no added resistance   Other exercises 5: 10x light resistance sponge tasks, squeezing, pinching and pushing into sponge with thumb           Assessment Pt continues to exhibit impaired wrist active and passive movement. OT discussed use of a dynamic splint to increase right wrist flexion/extension.   Pt reports she has an appointment with the orthopedic MD next week and she will discuss

## 2018-03-19 ENCOUNTER — HOSPITAL ENCOUNTER (OUTPATIENT)
Dept: OCCUPATIONAL THERAPY | Age: 68
Setting detail: THERAPIES SERIES
Discharge: HOME | End: 2018-03-19
Payer: MEDICARE

## 2018-03-19 PROCEDURE — 97140 MANUAL THERAPY 1/> REGIONS: CPT

## 2018-03-19 PROCEDURE — 97530 THERAPEUTIC ACTIVITIES: CPT

## 2018-03-21 ENCOUNTER — HOSPITAL ENCOUNTER (OUTPATIENT)
Dept: OCCUPATIONAL THERAPY | Age: 68
Setting detail: THERAPIES SERIES
Discharge: HOME | End: 2018-03-21
Payer: MEDICARE

## 2018-03-21 PROCEDURE — 97530 THERAPEUTIC ACTIVITIES: CPT

## 2018-03-21 NOTE — PROGRESS NOTES
with 5#  vs. 4# on 3/12/18. OT reviewed use of Dynasplint to stretch tissue and increase motion, specifically wrist /.  Pt declined pursuit of a splint system at this time. Pt participated in therapeutic tasks to increase AROM of right distal UE. Other exercises  Other exercises?: Yes  Other exercises 1: AROM ROM wire 3x over and back   Other exercises 2: lateral pinch with resistance 1# therapy pin 5x on/off target   Other exercises 3: shabazz pinch 20 x use of 1# resistive pin to prehend small 1/2 \" diameter ball   Other exercises 4: squeezing of large and small, light resistance syringes with H2O   Other exercises 5: passive stretch placement of hand on table in standing moving body over limb for wrist /.  Other exercises 6: wrist roll exerciser no added resistance 5x up and down  Other exercises 7: squeezing of H2O from small sponges 15x using lateral pinch   Other exercises 8: nut roll on vertical bolt using pad to pad pinch            Hand Dominance  Hand Dominance: Right  Right Hand Strength -  (lbs)  Handle Setting 2: 5#     Assessment Pt continues to report discomfort/pain experienced on lateral side of right arm when grasping items. Pt states she continues to attempt to use her hand for activity \"challenging\" herself to complete items like squeezing a bottle or opening a door with the knob. 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 Pt demonstrates functional finger flexion, but  remains poor. Post Treatment Pain Screening  Pain at present: 2  Scale Used: Faces         Plan Continue with established treatment plan.                   Goals addressed- ALL  Long term goals  Time Frame for Long term goals : Pt will demonstrate 45 degrees right wrist flexion and /.   Long term goal 1: Pt will exhibit full right forearm supination and 4/5 grade strength  Long term goal 2: Pt will demonstrate WNLs right digit AROM all movements   Long term goal 3: Pt will attain right  pf 25-30# and pinch of 5-8#  Long term goal 4: Pt will exhibit WNLs dexterity/coordination as evidenced by improved scores on formal testing and pt report of effective manipulation of small items   Long term goal 5: Pt will participate in daily activities without report of right distal UE pain  Long term goals 6: Pt will demonstrate good understanding of all recommended home programs after instruction complete     Therapy Time   Individual Concurrent Group Co-treatment   Time In  1108         Time Out  1205         Minutes  62                 Hellen Montes OTR/L Electronically signed by Rosilyn Riedel, OTR/L on 3/21/18 at 1:49 PM

## 2018-03-26 ENCOUNTER — HOSPITAL ENCOUNTER (OUTPATIENT)
Dept: OCCUPATIONAL THERAPY | Age: 68
Setting detail: THERAPIES SERIES
Discharge: HOME | End: 2018-03-26
Payer: MEDICARE

## 2018-03-26 PROCEDURE — 97530 THERAPEUTIC ACTIVITIES: CPT

## 2018-03-26 NOTE — PROGRESS NOTES
Occupational Therapy  Daily Treatment Note  Date: 3/26/2018  Patient Name: Caden Garcia  :  1950  MRN: [id number]       Subjective   OT Visit Information  Onset Date: 17  OT Insurance Information: Medicare   Progress Note Counter: 8 after evaluation  Subjective  Subjective: Pt reports \"I'm working again! 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 Pt reports this is due to her 's cardiac rehab schedule. 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 Pt reports she is adding her client's in slowly. Pt states she is now able to use the curling iron, comb, cut and style hair. Pt states she has not attempted all tasks yet. Other exercises  Other exercises?: Yes  Other exercises 1: rubber band task 20 x abduction  of digits with stretching of rubberbands and placement on cylinder ( Pt to add this task to current HEP)  Other exercises 2: ab and adduction of digits in play mando  Other exercises 3: rolling and \"pressing out\" mando using 3 inch and 4 inch diameter circles        Modalities: Yes  Other: Infrared heat treatment,  6 joules along radial side of wrist and level of thumb          Assessment Pt tolerated tasks of treatment well. 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 Post Treatment Pain Screening  Pain at present: 2  Scale Used: Faces  Intervention List: Patient declined any intervention         Plan Continue with established treatment plan.              Goals addressed- ALL  Long term goals  Time Frame for Long term goals : Pt will demonstrate 45 degrees right wrist flexion and /.   Long term goal 1: Pt will exhibit full right forearm supination and 4/5 grade strength  Long term goal 2: Pt will

## 2018-03-28 ENCOUNTER — HOSPITAL ENCOUNTER (OUTPATIENT)
Dept: OCCUPATIONAL THERAPY | Age: 68
Setting detail: THERAPIES SERIES
Discharge: HOME | End: 2018-03-28
Payer: MEDICARE

## 2018-03-28 PROCEDURE — 97140 MANUAL THERAPY 1/> REGIONS: CPT

## 2018-03-28 PROCEDURE — 97022 WHIRLPOOL THERAPY: CPT

## 2018-03-28 NOTE — PROGRESS NOTES
Score                                           Goals  Long term goals 1-6 addressed.   Time Frame for Long term goals : Pt will demonstrate 45 degrees right wrist flexion and /.   Long term goal 1: Pt will exhibit full right forearm supination and 4/5 grade strength  Long term goal 2: Pt will demonstrate WNLs right digit AROM all movements   Long term goal 3: Pt will attain right  pf 25-30# and pinch of 5-8#  Long term goal 4: Pt will exhibit WNLs dexterity/coordination as evidenced by improved scores on formal testing and pt report of effective manipulation of small items   Long term goal 5: Pt will participate in daily activities without report of right distal UE pain  Long term goals 6: Pt will demonstrate good understanding of all recommended home programs after instruction complete     Therapy Time   Individual Concurrent Group Co-treatment   Time In  10:00 am         Time Out  11:00 am         Minutes  60                 PRANEETH Lopez/L  Electronically signed by STANFORD Lopez on 3/28/18 at 12:14 PM

## 2018-04-04 ENCOUNTER — HOSPITAL ENCOUNTER (OUTPATIENT)
Dept: OCCUPATIONAL THERAPY | Age: 68
Setting detail: THERAPIES SERIES
Discharge: HOME | End: 2018-04-04
Payer: MEDICARE

## 2018-04-04 PROCEDURE — 97110 THERAPEUTIC EXERCISES: CPT

## 2018-04-04 PROCEDURE — 97530 THERAPEUTIC ACTIVITIES: CPT

## 2018-04-09 ENCOUNTER — APPOINTMENT (OUTPATIENT)
Dept: OCCUPATIONAL THERAPY | Age: 68
End: 2018-04-09
Payer: MEDICARE

## 2018-04-11 ENCOUNTER — HOSPITAL ENCOUNTER (OUTPATIENT)
Dept: OCCUPATIONAL THERAPY | Age: 68
Setting detail: THERAPIES SERIES
Discharge: HOME | End: 2018-04-11
Payer: MEDICARE

## 2018-04-11 PROCEDURE — 97530 THERAPEUTIC ACTIVITIES: CPT

## 2018-04-11 PROCEDURE — G8988 SELF CARE GOAL STATUS: HCPCS

## 2018-04-11 PROCEDURE — 97110 THERAPEUTIC EXERCISES: CPT

## 2018-04-11 PROCEDURE — G8987 SELF CARE CURRENT STATUS: HCPCS

## 2018-04-16 ENCOUNTER — HOSPITAL ENCOUNTER (OUTPATIENT)
Dept: OCCUPATIONAL THERAPY | Age: 68
Setting detail: THERAPIES SERIES
Discharge: HOME | End: 2018-04-16
Payer: MEDICARE

## 2018-04-16 PROCEDURE — G8988 SELF CARE GOAL STATUS: HCPCS

## 2018-04-16 PROCEDURE — 97530 THERAPEUTIC ACTIVITIES: CPT

## 2018-04-16 PROCEDURE — G8987 SELF CARE CURRENT STATUS: HCPCS

## 2018-04-18 ENCOUNTER — HOSPITAL ENCOUNTER (OUTPATIENT)
Dept: OCCUPATIONAL THERAPY | Age: 68
Setting detail: THERAPIES SERIES
Discharge: HOME | End: 2018-04-18
Payer: MEDICARE

## 2018-04-18 PROCEDURE — 97530 THERAPEUTIC ACTIVITIES: CPT

## 2018-04-18 PROCEDURE — 97140 MANUAL THERAPY 1/> REGIONS: CPT

## 2018-04-23 ENCOUNTER — HOSPITAL ENCOUNTER (OUTPATIENT)
Dept: OCCUPATIONAL THERAPY | Age: 68
Setting detail: THERAPIES SERIES
Discharge: HOME | End: 2018-04-23
Payer: MEDICARE

## 2018-04-23 PROCEDURE — 97530 THERAPEUTIC ACTIVITIES: CPT

## 2018-04-23 PROCEDURE — 97140 MANUAL THERAPY 1/> REGIONS: CPT

## 2018-04-25 ENCOUNTER — HOSPITAL ENCOUNTER (OUTPATIENT)
Dept: OCCUPATIONAL THERAPY | Age: 68
Setting detail: THERAPIES SERIES
Discharge: HOME | End: 2018-04-25
Payer: MEDICARE

## 2018-04-25 PROCEDURE — 97530 THERAPEUTIC ACTIVITIES: CPT

## 2018-04-30 ENCOUNTER — HOSPITAL ENCOUNTER (OUTPATIENT)
Dept: OCCUPATIONAL THERAPY | Age: 68
Setting detail: THERAPIES SERIES
Discharge: HOME | End: 2018-04-30
Payer: MEDICARE

## 2018-04-30 PROCEDURE — 97140 MANUAL THERAPY 1/> REGIONS: CPT

## 2018-04-30 PROCEDURE — 97035 APP MDLTY 1+ULTRASOUND EA 15: CPT

## 2018-05-02 ENCOUNTER — HOSPITAL ENCOUNTER (OUTPATIENT)
Dept: OCCUPATIONAL THERAPY | Age: 68
Setting detail: THERAPIES SERIES
Discharge: HOME | End: 2018-05-02
Payer: MEDICARE

## 2018-05-02 PROCEDURE — 97530 THERAPEUTIC ACTIVITIES: CPT

## 2018-05-02 PROCEDURE — 97035 APP MDLTY 1+ULTRASOUND EA 15: CPT

## 2018-05-02 PROCEDURE — 97140 MANUAL THERAPY 1/> REGIONS: CPT

## 2018-05-07 ENCOUNTER — APPOINTMENT (OUTPATIENT)
Dept: OCCUPATIONAL THERAPY | Age: 68
End: 2018-05-07
Payer: MEDICARE

## 2018-05-09 ENCOUNTER — HOSPITAL ENCOUNTER (OUTPATIENT)
Dept: OCCUPATIONAL THERAPY | Age: 68
Setting detail: THERAPIES SERIES
Discharge: HOME | End: 2018-05-09
Payer: MEDICARE

## 2018-05-09 PROCEDURE — 97530 THERAPEUTIC ACTIVITIES: CPT

## 2018-05-09 PROCEDURE — 97140 MANUAL THERAPY 1/> REGIONS: CPT

## 2018-05-14 ENCOUNTER — HOSPITAL ENCOUNTER (OUTPATIENT)
Dept: OCCUPATIONAL THERAPY | Age: 68
Setting detail: THERAPIES SERIES
Discharge: HOME | End: 2018-05-14
Payer: MEDICARE

## 2018-05-16 ENCOUNTER — HOSPITAL ENCOUNTER (OUTPATIENT)
Dept: OCCUPATIONAL THERAPY | Age: 68
Setting detail: THERAPIES SERIES
Discharge: HOME | End: 2018-05-16
Payer: MEDICARE

## 2018-05-16 PROCEDURE — 97140 MANUAL THERAPY 1/> REGIONS: CPT

## 2018-05-16 PROCEDURE — 97530 THERAPEUTIC ACTIVITIES: CPT

## 2018-05-23 ENCOUNTER — HOSPITAL ENCOUNTER (OUTPATIENT)
Dept: OCCUPATIONAL THERAPY | Age: 68
Setting detail: THERAPIES SERIES
Discharge: HOME | End: 2018-05-23
Payer: MEDICARE

## 2018-05-23 PROCEDURE — 97530 THERAPEUTIC ACTIVITIES: CPT

## 2018-05-23 PROCEDURE — G8988 SELF CARE GOAL STATUS: HCPCS

## 2018-05-23 PROCEDURE — G8987 SELF CARE CURRENT STATUS: HCPCS

## 2018-05-23 PROCEDURE — G8989 SELF CARE D/C STATUS: HCPCS

## 2018-05-30 ENCOUNTER — APPOINTMENT (OUTPATIENT)
Dept: OCCUPATIONAL THERAPY | Age: 68
End: 2018-05-30
Payer: MEDICARE

## 2021-09-24 ENCOUNTER — OFFICE VISIT (OUTPATIENT)
Dept: FAMILY MEDICINE CLINIC | Age: 71
End: 2021-09-24
Payer: MEDICARE

## 2021-09-24 VITALS
TEMPERATURE: 96.4 F | HEART RATE: 84 BPM | OXYGEN SATURATION: 96 % | DIASTOLIC BLOOD PRESSURE: 64 MMHG | SYSTOLIC BLOOD PRESSURE: 112 MMHG | BODY MASS INDEX: 24.22 KG/M2 | WEIGHT: 124 LBS

## 2021-09-24 DIAGNOSIS — B02.9 HERPES ZOSTER WITHOUT COMPLICATION: Primary | ICD-10-CM

## 2021-09-24 PROCEDURE — 4040F PNEUMOC VAC/ADMIN/RCVD: CPT | Performed by: PHYSICIAN ASSISTANT

## 2021-09-24 PROCEDURE — 1090F PRES/ABSN URINE INCON ASSESS: CPT | Performed by: PHYSICIAN ASSISTANT

## 2021-09-24 PROCEDURE — 99213 OFFICE O/P EST LOW 20 MIN: CPT | Performed by: PHYSICIAN ASSISTANT

## 2021-09-24 PROCEDURE — G8400 PT W/DXA NO RESULTS DOC: HCPCS | Performed by: PHYSICIAN ASSISTANT

## 2021-09-24 PROCEDURE — 3017F COLORECTAL CA SCREEN DOC REV: CPT | Performed by: PHYSICIAN ASSISTANT

## 2021-09-24 PROCEDURE — 1036F TOBACCO NON-USER: CPT | Performed by: PHYSICIAN ASSISTANT

## 2021-09-24 PROCEDURE — G8427 DOCREV CUR MEDS BY ELIG CLIN: HCPCS | Performed by: PHYSICIAN ASSISTANT

## 2021-09-24 PROCEDURE — 1123F ACP DISCUSS/DSCN MKR DOCD: CPT | Performed by: PHYSICIAN ASSISTANT

## 2021-09-24 PROCEDURE — G8420 CALC BMI NORM PARAMETERS: HCPCS | Performed by: PHYSICIAN ASSISTANT

## 2021-09-24 RX ORDER — VALACYCLOVIR HYDROCHLORIDE 1 G/1
1000 TABLET, FILM COATED ORAL 3 TIMES DAILY
Qty: 21 TABLET | Refills: 0 | Status: SHIPPED | OUTPATIENT
Start: 2021-09-24 | End: 2021-10-01

## 2021-09-24 SDOH — ECONOMIC STABILITY: FOOD INSECURITY: WITHIN THE PAST 12 MONTHS, YOU WORRIED THAT YOUR FOOD WOULD RUN OUT BEFORE YOU GOT MONEY TO BUY MORE.: NEVER TRUE

## 2021-09-24 SDOH — ECONOMIC STABILITY: FOOD INSECURITY: WITHIN THE PAST 12 MONTHS, THE FOOD YOU BOUGHT JUST DIDN'T LAST AND YOU DIDN'T HAVE MONEY TO GET MORE.: NEVER TRUE

## 2021-09-24 SDOH — ECONOMIC STABILITY: TRANSPORTATION INSECURITY
IN THE PAST 12 MONTHS, HAS LACK OF TRANSPORTATION KEPT YOU FROM MEETINGS, WORK, OR FROM GETTING THINGS NEEDED FOR DAILY LIVING?: NO

## 2021-09-24 SDOH — ECONOMIC STABILITY: TRANSPORTATION INSECURITY
IN THE PAST 12 MONTHS, HAS THE LACK OF TRANSPORTATION KEPT YOU FROM MEDICAL APPOINTMENTS OR FROM GETTING MEDICATIONS?: NO

## 2021-09-24 ASSESSMENT — PATIENT HEALTH QUESTIONNAIRE - PHQ9
SUM OF ALL RESPONSES TO PHQ QUESTIONS 1-9: 0
SUM OF ALL RESPONSES TO PHQ QUESTIONS 1-9: 0
2. FEELING DOWN, DEPRESSED OR HOPELESS: 0
SUM OF ALL RESPONSES TO PHQ QUESTIONS 1-9: 0
SUM OF ALL RESPONSES TO PHQ9 QUESTIONS 1 & 2: 0
1. LITTLE INTEREST OR PLEASURE IN DOING THINGS: 0

## 2021-09-24 ASSESSMENT — ENCOUNTER SYMPTOMS
VOMITING: 0
ABDOMINAL PAIN: 0
SHORTNESS OF BREATH: 0
RHINORRHEA: 0
SINUS PRESSURE: 0
SINUS PAIN: 0
CHEST TIGHTNESS: 0
DIARRHEA: 0
NAIL CHANGES: 0
EYE PAIN: 0
NAUSEA: 0
SORE THROAT: 0
BACK PAIN: 0
COUGH: 0

## 2021-09-24 ASSESSMENT — VISUAL ACUITY: OU: 1

## 2021-09-24 ASSESSMENT — SOCIAL DETERMINANTS OF HEALTH (SDOH): HOW HARD IS IT FOR YOU TO PAY FOR THE VERY BASICS LIKE FOOD, HOUSING, MEDICAL CARE, AND HEATING?: NOT HARD AT ALL

## 2021-09-24 NOTE — PROGRESS NOTES
[address] [facility] Encounter  CHIEF COMPLAINT       Chief Complaint   Patient presents with    Skin Problem     sharp pains, rash left side mid back to front(around waist) , patient believes that it is shingles [id number],        Mahamed Kourtney Flannery is a 70 y.o. female who presents with:  Rash  This is a new problem. The current episode started yesterday. The affected locations include the abdomen. The rash is characterized by blistering, burning, itchiness and redness. She was exposed to nothing. Pertinent negatives include no anorexia, congestion, cough, diarrhea, eye pain, facial edema, fatigue, fever, joint pain, nail changes, rhinorrhea, shortness of breath, sore throat or vomiting. Past treatments include nothing. Her past medical history is significant for varicella. There is no history of allergies, asthma or eczema. REVIEW OF SYSTEMS     Review of Systems   Constitutional: Negative for activity change, appetite change, chills, fatigue and fever. HENT: Negative for congestion, drooling, rhinorrhea, sinus pressure, sinus pain and sore throat. Eyes: Negative for pain and visual disturbance. Respiratory: Negative for cough, chest tightness and shortness of breath. Cardiovascular: Negative for chest pain. Gastrointestinal: Negative for abdominal pain, anorexia, diarrhea, nausea and vomiting. Endocrine: Negative for cold intolerance. Genitourinary: Negative for dysuria, flank pain, frequency and hematuria. Musculoskeletal: Negative for arthralgias, back pain and joint pain. Skin: Positive for rash. Negative for nail changes. Allergic/Immunologic: Negative for food allergies. Neurological: Negative for weakness, light-headedness, numbness and headaches. Hematological: Does not bruise/bleed easily. PAST MEDICAL HISTORY   No past medical history on file.   SURGICAL HISTORY     Patient  has a past surgical history that includes open treatment radial shaft fracture (Right, 1/8/2018). CURRENT MEDICATIONS       Previous Medications    No medications on file     ALLERGIES     Patient is is allergic to aspirin, codeine, and salicylates. FAMILY HISTORY     Patient'sfamily history is not on file. SOCIAL HISTORY     Patient  reports that she has never smoked. She has never used smokeless tobacco. She reports that she does not drink alcohol and does not use drugs. PHYSICAL EXAM     VITALS  BP: 112/64, Temp: 96.4 °F (35.8 °C), Pulse: 84,  , SpO2: 96 %  Physical Exam  Vitals and nursing note reviewed. Constitutional:       General: She is awake. She is not in acute distress. Appearance: Normal appearance. She is well-developed. She is not ill-appearing, toxic-appearing or diaphoretic. HENT:      Head: Normocephalic and atraumatic. Right Ear: Hearing and external ear normal.      Left Ear: Hearing and external ear normal.      Nose: Nose normal.   Eyes:      General: Lids are normal. Vision grossly intact. Gaze aligned appropriately. Conjunctiva/sclera: Conjunctivae normal.   Cardiovascular:      Rate and Rhythm: Normal rate and regular rhythm. Pulses: Normal pulses. Heart sounds: Normal heart sounds, S1 normal and S2 normal.   Pulmonary:      Effort: Pulmonary effort is normal.      Breath sounds: Normal breath sounds and air entry. Musculoskeletal:      Cervical back: Normal range of motion. Skin:     General: Skin is warm. Capillary Refill: Capillary refill takes less than 2 seconds. Findings: Rash present. Rash is vesicular. Comments: Vesicular rash in dermatomal pattern. No sign of infection    Neurological:      Mental Status: She is alert and oriented to person, place, and time. Gait: Gait is intact.    Psychiatric:         Attention and Perception: Attention normal.         Mood and Affect: Mood normal.         Speech: Speech normal.         Behavior: Behavior normal. Behavior is cooperative. READY CARE COURSE   Labs:  No results found for this visit on 09/24/21. IMAGING:  No orders to display     Scheduled Meds:  Continuous Infusions:  PRN Meds:. PROCEDURES:  FINAL IMPRESSION      1. Herpes zoster without complication      DISPOSITION/PLAN   1. Patient was started on valacyclovir. Patient is to take medication as directed. Please take with food. Patient is to continue pain control with tylenol. Discussed signs and symptoms which require immediate follow-up in ED/call to 911. Patient verbalized understanding. Went over possible s/e of the medications. Patient would like to proceed with therapy. On this date 9/24/2021 I have spent 20 minutes reviewing previous notes, test results and face to face with the patient discussing the diagnosis and importance of compliance with the treatment plan as well as documenting on the day of the visit. PATIENT REFERRED TO:  Return if symptoms worsen or fail to improve. DISCHARGE MEDICATIONS:  New Prescriptions    VALACYCLOVIR (VALTREX) 1 G TABLET    Take 1 tablet by mouth 3 times daily for 7 days     Cannot display discharge medications since this is not an admission.        Romona Castleman, [place]

## 2021-09-24 NOTE — PATIENT INSTRUCTIONS
Patient Education        Shingles: Care Instructions  Your Care Instructions     Shingles (herpes zoster) causes pain and a blistered rash. The rash can appear anywhere on the body but will be on only one side of the body, the left or right. It will be in a band, a strip, or a small area. The pain can be very severe. Shingles can also cause tingling or itching in the area of the rash. The blisters scab over after a few days and heal in 2 to 4 weeks. Medicines can help you feel better and may help prevent more serious problems caused by shingles. Shingles is caused by the same virus that causes chickenpox. When you have chickenpox, the virus gets into your nerve roots and stays there (becomes dormant) long after you get over the chickenpox. If the virus becomes active again, it can cause shingles. Follow-up care is a key part of your treatment and safety. Be sure to make and go to all appointments, and call your doctor if you are having problems. It's also a good idea to know your test results and keep a list of the medicines you take. How can you care for yourself at home? · Be safe with medicines. Take your medicines exactly as prescribed. Call your doctor if you think you are having a problem with your medicine. Antiviral medicine helps you get better faster. · Try not to scratch or pick at the blisters. They will crust over and fall off on their own if you leave them alone. · Put cool, wet cloths on the area to relieve pain and itching. You can also use calamine lotion. Try not to use so much lotion that it cakes and is hard to get off. · Put cornstarch or baking soda on the sores to help dry them out so they heal faster. · Do not use thick ointment, such as petroleum jelly, on the sores. This will keep them from drying and healing. · To help remove loose crusts, soak them in tap water. This can help decrease oozing, and dry and soothe the skin.   · Take an over-the-counter pain medicine, such as

## 2021-09-27 ENCOUNTER — TELEPHONE (OUTPATIENT)
Dept: FAMILY MEDICINE CLINIC | Age: 71
End: 2021-09-27

## 2021-10-06 ENCOUNTER — OFFICE VISIT (OUTPATIENT)
Dept: FAMILY MEDICINE CLINIC | Age: 71
End: 2021-10-06
Payer: MEDICARE

## 2021-10-06 VITALS
WEIGHT: 125.2 LBS | SYSTOLIC BLOOD PRESSURE: 116 MMHG | BODY MASS INDEX: 23.64 KG/M2 | HEART RATE: 64 BPM | OXYGEN SATURATION: 99 % | DIASTOLIC BLOOD PRESSURE: 64 MMHG | HEIGHT: 61 IN

## 2021-10-06 DIAGNOSIS — Z00.00 HEALTH CARE MAINTENANCE: ICD-10-CM

## 2021-10-06 DIAGNOSIS — Z76.89 ENCOUNTER TO ESTABLISH CARE: Primary | ICD-10-CM

## 2021-10-06 PROCEDURE — G0438 PPPS, INITIAL VISIT: HCPCS | Performed by: INTERNAL MEDICINE

## 2021-10-06 PROCEDURE — 1036F TOBACCO NON-USER: CPT | Performed by: INTERNAL MEDICINE

## 2021-10-06 PROCEDURE — 1123F ACP DISCUSS/DSCN MKR DOCD: CPT | Performed by: INTERNAL MEDICINE

## 2021-10-06 PROCEDURE — 3017F COLORECTAL CA SCREEN DOC REV: CPT | Performed by: INTERNAL MEDICINE

## 2021-10-06 PROCEDURE — G8400 PT W/DXA NO RESULTS DOC: HCPCS | Performed by: INTERNAL MEDICINE

## 2021-10-06 PROCEDURE — 4040F PNEUMOC VAC/ADMIN/RCVD: CPT | Performed by: INTERNAL MEDICINE

## 2021-10-06 PROCEDURE — G8484 FLU IMMUNIZE NO ADMIN: HCPCS | Performed by: INTERNAL MEDICINE

## 2021-10-06 PROCEDURE — 90694 VACC AIIV4 NO PRSRV 0.5ML IM: CPT | Performed by: INTERNAL MEDICINE

## 2021-10-06 PROCEDURE — G0008 ADMIN INFLUENZA VIRUS VAC: HCPCS | Performed by: INTERNAL MEDICINE

## 2021-10-06 PROCEDURE — G8420 CALC BMI NORM PARAMETERS: HCPCS | Performed by: INTERNAL MEDICINE

## 2021-10-06 PROCEDURE — G8427 DOCREV CUR MEDS BY ELIG CLIN: HCPCS | Performed by: INTERNAL MEDICINE

## 2021-10-06 PROCEDURE — 1090F PRES/ABSN URINE INCON ASSESS: CPT | Performed by: INTERNAL MEDICINE

## 2021-10-06 ASSESSMENT — ENCOUNTER SYMPTOMS
CHEST TIGHTNESS: 0
COUGH: 0
DIARRHEA: 0
SINUS PRESSURE: 0
EYE ITCHING: 0
ABDOMINAL DISTENTION: 0
VOMITING: 0
EYE REDNESS: 0
COLOR CHANGE: 0
SINUS PAIN: 0
SHORTNESS OF BREATH: 0
SORE THROAT: 0
EYE DISCHARGE: 0
ABDOMINAL PAIN: 0
WHEEZING: 0

## 2021-10-06 NOTE — PROGRESS NOTES
Vaccine Information Sheet, \"Influenza - Inactivated\"  given to Luci Ahn, or parent/legal guardian of  Luci Ahn and verbalized understanding. Patient responses:    Have you ever had a reaction to a flu vaccine? No  Are you able to eat eggs without adverse effects? Yes  Do you have any current illness? No  Have you ever had Guillian Williams Syndrome? No    Flu vaccine given per order. Please see immunization tab.

## 2021-10-06 NOTE — PROGRESS NOTES
Subjective:      Patient ID: Tyna Boeck is a 70 y.o. female who presents today for:  Chief Complaint   Patient presents with    [facility]        HPI   Patient presenting today to [facility]. Overall patient states she is doing well and has no active complaints. She was recently diagnosed with Shingles and placed on Valacyclovir. She still experiences some residual pain but states it responds well to Tylenol. A long-standing history of left-sided hearing loss is also reported for which she has a hearing aid in-situ. Medical History is otherwise unremarkable. She currently takes no medications. As regards to her social history, she is a non-smoker and endorses only minimal alcohol use. Family history as below. Past Medical History:   Diagnosis Date    Hearing loss      Past Surgical History:   Procedure Laterality Date    OPEN TREATMENT RADIAL SHAFT FRACTURE Right 1/8/2018    RIGHT WRIST ORIF DISTAL RADIUS performed by Patrica Snyder MD at [facility]     Family History   Problem Relation Age of Onset    Heart Attack Mother      Allergies   Allergen Reactions    Aspirin     Codeine     Salicylates Other (See Comments)     No current outpatient medications on file prior to visit. No current facility-administered medications on file prior to visit. Review of Systems   Constitutional: Negative for activity change, appetite change, chills, fatigue, fever and unexpected weight change. HENT: Negative for ear discharge, ear pain, sinus pressure, sinus pain and sore throat. Eyes: Negative for discharge, redness, itching and visual disturbance. Respiratory: Negative for cough, chest tightness, shortness of breath and wheezing. Cardiovascular: Negative for chest pain, palpitations and leg swelling. Gastrointestinal: Negative for abdominal distention, abdominal pain, diarrhea and vomiting. Endocrine: Negative for cold intolerance, heat intolerance, polydipsia and polyuria. focal deficit present. Mental Status: She is alert and oriented to person, place, and time. Mental status is at baseline. Assessment:      Jimbo Polk was seen today for establish care. Diagnoses and all orders for this visit:    Encounter to establish care  - No active complaints, ROS unremarkable. - Vitals wnl.   - Medical history significant only for shingles and remote hearing loss. - Past Medical, Family and Social history reviewed and medical documentation updated. - Routine labs ordered, will reevaluate with findings. - Counseled on general lifestyle modifications, risk factor reduction.  - F/u in 6 months. Health care maintenance  - Up to date with Health Screening  -  Flu vaccine today- INFLUENZA, QUADV, ADJUVANTED, 65 YRS =, IM, PF, PREFILL SYR, 0.5ML (FLUAD)  - CBC With Auto Differential; Future  -  Lipid Panel; Future  - TSH with Reflex; Future        Health Maintenance   Topic Date Due    Hepatitis C screen  Never done    Lipid screen  Never done    Colon cancer screen colonoscopy  Never done    Breast cancer screen  Never done    Shingles Vaccine (1 of 2) Never done    DEXA (modify frequency per FRAX score)  Never done    Annual Wellness Visit (AWV)  Never done    DTaP/Tdap/Td vaccine (2 - Td or Tdap) 10/20/2025    Flu vaccine  Completed    Pneumococcal 65+ years Vaccine  Completed    COVID-19 Vaccine  Completed    Hepatitis A vaccine  Aged Out    Hepatitis B vaccine  Aged Out    Hib vaccine  Aged Out    Meningococcal (ACWY) vaccine  Aged Out            Plan:      Patient presenting for Annual Physical and to establish care. No active complaints. Plan as above.     Orders Placed This Encounter   Procedures    INFLUENZA, QUADV, ADJUVANTED, 72 YRS =, IM, PF, PREFILL SYR, 0.5ML (FLUAD)    CBC With Auto Differential     Standing Status:   Future     Standing Expiration Date:   10/6/2022    Lipid Panel     Standing Status:   Future     Standing Expiration Date: 10/6/2022     Order Specific Question:   Is Patient Fasting?/# of Hours     Answer:   non-fasting.  TSH with Reflex     Standing Status:   Future     Standing Expiration Date:   10/6/2022    61018 - CT REPR CMPL WND TRUNK 1.1-2.5CM     No orders of the defined types were placed in this encounter. Return in about 6 months (around 4/6/2022).     Kristie Reardon MD

## 2021-10-23 DIAGNOSIS — Z00.00 HEALTH CARE MAINTENANCE: ICD-10-CM

## 2021-10-23 LAB
BASOPHILS ABSOLUTE: 0 K/UL (ref 0–0.2)
BASOPHILS RELATIVE PERCENT: 0.5 %
CHOLESTEROL, TOTAL: 230 MG/DL (ref 0–199)
EOSINOPHILS ABSOLUTE: 0.1 K/UL (ref 0–0.7)
EOSINOPHILS RELATIVE PERCENT: 1.1 %
HCT VFR BLD CALC: 38.2 % (ref 37–47)
HDLC SERPL-MCNC: 72 MG/DL (ref 40–59)
HEMOGLOBIN: 13.1 G/DL (ref 12–16)
LDL CHOLESTEROL CALCULATED: 144 MG/DL (ref 0–129)
LYMPHOCYTES ABSOLUTE: 2.3 K/UL (ref 1–4.8)
LYMPHOCYTES RELATIVE PERCENT: 38.8 %
MCH RBC QN AUTO: 33 PG (ref 27–31.3)
MCHC RBC AUTO-ENTMCNC: 34.3 % (ref 33–37)
MCV RBC AUTO: 96.2 FL (ref 82–100)
MONOCYTES ABSOLUTE: 0.6 K/UL (ref 0.2–0.8)
MONOCYTES RELATIVE PERCENT: 9.7 %
NEUTROPHILS ABSOLUTE: 3 K/UL (ref 1.4–6.5)
NEUTROPHILS RELATIVE PERCENT: 49.9 %
PDW BLD-RTO: 14.8 % (ref 11.5–14.5)
PLATELET # BLD: 285 K/UL (ref 130–400)
RBC # BLD: 3.97 M/UL (ref 4.2–5.4)
TRIGL SERPL-MCNC: 70 MG/DL (ref 0–150)
TSH REFLEX: 2.05 UIU/ML (ref 0.44–3.86)
WBC # BLD: 6 K/UL (ref 4.8–10.8)

## 2022-04-13 ENCOUNTER — OFFICE VISIT (OUTPATIENT)
Dept: FAMILY MEDICINE CLINIC | Age: 72
End: 2022-04-13
Payer: MEDICARE

## 2022-04-13 VITALS
WEIGHT: 126 LBS | SYSTOLIC BLOOD PRESSURE: 102 MMHG | HEIGHT: 61 IN | DIASTOLIC BLOOD PRESSURE: 64 MMHG | BODY MASS INDEX: 23.79 KG/M2 | TEMPERATURE: 96.5 F | HEART RATE: 61 BPM | OXYGEN SATURATION: 99 %

## 2022-04-13 DIAGNOSIS — Z23 NEED FOR SHINGLES VACCINE: ICD-10-CM

## 2022-04-13 DIAGNOSIS — Z12.11 COLON CANCER SCREENING: ICD-10-CM

## 2022-04-13 DIAGNOSIS — Z86.19 HISTORY OF SHINGLES: ICD-10-CM

## 2022-04-13 DIAGNOSIS — Z12.31 ENCOUNTER FOR SCREENING MAMMOGRAM FOR MALIGNANT NEOPLASM OF BREAST: ICD-10-CM

## 2022-04-13 DIAGNOSIS — Z00.00 ROUTINE ADULT HEALTH MAINTENANCE: Primary | ICD-10-CM

## 2022-04-13 PROCEDURE — G8400 PT W/DXA NO RESULTS DOC: HCPCS | Performed by: INTERNAL MEDICINE

## 2022-04-13 PROCEDURE — 1090F PRES/ABSN URINE INCON ASSESS: CPT | Performed by: INTERNAL MEDICINE

## 2022-04-13 PROCEDURE — G8427 DOCREV CUR MEDS BY ELIG CLIN: HCPCS | Performed by: INTERNAL MEDICINE

## 2022-04-13 PROCEDURE — 3017F COLORECTAL CA SCREEN DOC REV: CPT | Performed by: INTERNAL MEDICINE

## 2022-04-13 PROCEDURE — G8420 CALC BMI NORM PARAMETERS: HCPCS | Performed by: INTERNAL MEDICINE

## 2022-04-13 PROCEDURE — 1036F TOBACCO NON-USER: CPT | Performed by: INTERNAL MEDICINE

## 2022-04-13 PROCEDURE — 1123F ACP DISCUSS/DSCN MKR DOCD: CPT | Performed by: INTERNAL MEDICINE

## 2022-04-13 PROCEDURE — 99214 OFFICE O/P EST MOD 30 MIN: CPT | Performed by: INTERNAL MEDICINE

## 2022-04-13 PROCEDURE — 4040F PNEUMOC VAC/ADMIN/RCVD: CPT | Performed by: INTERNAL MEDICINE

## 2022-04-13 ASSESSMENT — ENCOUNTER SYMPTOMS
CHEST TIGHTNESS: 0
COUGH: 0
SHORTNESS OF BREATH: 0
VOMITING: 0
WHEEZING: 0
NAUSEA: 0

## 2022-04-13 NOTE — PROGRESS NOTES
Subjective:      Patient ID: Alexandra Crenshaw is a 70 y.o. female who presents today for:  Chief Complaint   Patient presents with    6 Month Follow-Up       HPI      Patient presenting for a routine follow up visit. Last office visit was 6 months ago. Patient states she has been doing well overall, offers no active medical complaints. Medical history is significant only for Shingles which was active at her last visit. She reports no residual lesions over the affected area but endorses residual pruritus. Past medical history is otherwise unremarkable. Routine labs ordered at last visit show no abnormalities. Past Medical History:   Diagnosis Date    Hearing loss      Past Surgical History:   Procedure Laterality Date    OPEN TREATMENT RADIAL SHAFT FRACTURE Right 1/8/2018    RIGHT WRIST ORIF DISTAL RADIUS performed by Shabnam Erazo MD at [facility]     Family History   Problem Relation Age of Onset    Heart Attack Mother      Allergies   Allergen Reactions    Aspirin     Codeine     Salicylates Other (See Comments)     Current Outpatient Medications on File Prior to Visit   Medication Sig Dispense Refill    Fexofenadine-Pseudoephedrine (ALLEGRA-D 12 HOUR PO) Take by mouth       No current facility-administered medications on file prior to visit. Review of Systems   Constitutional: Negative for activity change, chills, diaphoresis, fatigue and fever. Respiratory: Negative for cough, chest tightness, shortness of breath and wheezing. Cardiovascular: Negative for chest pain, palpitations and leg swelling. Gastrointestinal: Negative for nausea and vomiting. Neurological: Negative for dizziness, syncope, light-headedness and headaches.        Objective:   /64 (Site: Right Upper Arm, Position: Sitting, Cuff Size: Medium Adult)   Pulse 61   Temp 96.5 °F (35.8 °C) (Temporal)   Ht 5' 1\" (1.549 m)   Wt 126 lb (57.2 kg)   SpO2 99%   BMI 23.81 kg/m²     Physical COLONOSCOPY (Screening); Future        Health Maintenance   Topic Date Due    Hepatitis C screen  Never done    COVID-19 Vaccine (1) Never done    Colorectal Cancer Screen  Never done    Breast cancer screen  Never done    Shingles Vaccine (1 of 2) Never done    DEXA (modify frequency per FRAX score)  Never done    Depression Screen  09/24/2022    Annual Wellness Visit (AWV)  10/07/2022    DTaP/Tdap/Td vaccine (2 - Td or Tdap) 10/20/2025    Lipid screen  10/23/2026    Flu vaccine  Completed    Pneumococcal 65+ years Vaccine  Completed    Hepatitis A vaccine  Aged Out    Hepatitis B vaccine  Aged Out    Hib vaccine  Aged Out    Meningococcal (ACWY) vaccine  [employer]:    As above. Orders Placed This Encounter   Procedures    COLONOSCOPY (Screening)     Standing Status:   Future     Standing Expiration Date:   10/13/2022     Order Specific Question:   Screening or Diagnostic? Answer:   Screening    SHERIDAN DIGITAL SCREEN W OR WO CAD BILATERAL     Standing Status:   Future     Standing Expiration Date:   4/13/2023     Order Specific Question:   Reason for exam:     Answer:   Breast Cancer Screening    Zoster recombinant [facility])    Lipid Panel     Standing Status:   Future     Standing Expiration Date:   4/13/2023     Order Specific Question:   Is Patient Fasting?/# of Hours     Answer:   10    Comprehensive Metabolic Panel     Standing Status:   Future     Standing Expiration Date:   4/13/2023    Hepatitis C Antibody     Standing Status:   Future     Standing Expiration Date:   4/13/2023     No orders of the defined types were placed in this encounter. Return for F/u in 6 months.       Luis E Maddox MD

## 2022-04-20 DIAGNOSIS — Z00.00 ROUTINE ADULT HEALTH MAINTENANCE: ICD-10-CM

## 2022-04-20 LAB
ALBUMIN SERPL-MCNC: 4.9 G/DL (ref 3.5–4.6)
ALP BLD-CCNC: 94 U/L (ref 40–130)
ALT SERPL-CCNC: 13 U/L (ref 0–33)
ANION GAP SERPL CALCULATED.3IONS-SCNC: 14 MEQ/L (ref 9–15)
AST SERPL-CCNC: 20 U/L (ref 0–35)
BILIRUB SERPL-MCNC: <0.2 MG/DL (ref 0.2–0.7)
BUN BLDV-MCNC: 12 MG/DL (ref 8–23)
CALCIUM SERPL-MCNC: 9.6 MG/DL (ref 8.5–9.9)
CHLORIDE BLD-SCNC: 101 MEQ/L (ref 95–107)
CHOLESTEROL, TOTAL: 255 MG/DL (ref 0–199)
CO2: 23 MEQ/L (ref 20–31)
CREAT SERPL-MCNC: 0.59 MG/DL (ref 0.5–0.9)
GFR AFRICAN AMERICAN: >60
GFR NON-AFRICAN AMERICAN: >60
GLOBULIN: 2.8 G/DL (ref 2.3–3.5)
GLUCOSE BLD-MCNC: 94 MG/DL (ref 70–99)
HDLC SERPL-MCNC: 80 MG/DL (ref 40–59)
HEPATITIS C ANTIBODY: NONREACTIVE
LDL CHOLESTEROL CALCULATED: 160 MG/DL (ref 0–129)
POTASSIUM SERPL-SCNC: 4.9 MEQ/L (ref 3.4–4.9)
SODIUM BLD-SCNC: 138 MEQ/L (ref 135–144)
TOTAL PROTEIN: 7.7 G/DL (ref 6.3–8)
TRIGL SERPL-MCNC: 73 MG/DL (ref 0–150)

## 2022-05-24 ENCOUNTER — ANESTHESIA EVENT (OUTPATIENT)
Dept: ENDOSCOPY | Age: 72
End: 2022-05-24
Payer: MEDICARE

## 2022-05-25 ENCOUNTER — HOSPITAL ENCOUNTER (OUTPATIENT)
Age: 72
Setting detail: OUTPATIENT SURGERY
Discharge: HOME | End: 2022-05-25
Attending: INTERNAL MEDICINE | Admitting: INTERNAL MEDICINE
Payer: MEDICARE

## 2022-05-25 ENCOUNTER — ANESTHESIA (OUTPATIENT)
Dept: ENDOSCOPY | Age: 72
End: 2022-05-25
Payer: MEDICARE

## 2022-05-25 ENCOUNTER — APPOINTMENT (OUTPATIENT)
Dept: WOMENS IMAGING | Age: 72
End: 2022-05-25
Payer: MEDICARE

## 2022-05-25 ENCOUNTER — ANCILLARY PROCEDURE (OUTPATIENT)
Dept: ENDOSCOPY | Age: 72
End: 2022-05-25
Attending: INTERNAL MEDICINE
Payer: MEDICARE

## 2022-05-25 VITALS
HEIGHT: 61 IN | SYSTOLIC BLOOD PRESSURE: 108 MMHG | RESPIRATION RATE: 16 BRPM | TEMPERATURE: 97.5 F | OXYGEN SATURATION: 100 % | WEIGHT: 120 LBS | BODY MASS INDEX: 22.66 KG/M2 | DIASTOLIC BLOOD PRESSURE: 62 MMHG | HEART RATE: 69 BPM

## 2022-05-25 PROCEDURE — 3700000000 HC ANESTHESIA ATTENDED CARE: Performed by: INTERNAL MEDICINE

## 2022-05-25 PROCEDURE — 7100000011 HC PHASE II RECOVERY - ADDTL 15 MIN: Performed by: INTERNAL MEDICINE

## 2022-05-25 PROCEDURE — 45385 COLONOSCOPY W/LESION REMOVAL: CPT | Performed by: INTERNAL MEDICINE

## 2022-05-25 PROCEDURE — 7100000010 HC PHASE II RECOVERY - FIRST 15 MIN: Performed by: INTERNAL MEDICINE

## 2022-05-25 PROCEDURE — 2580000003 HC RX 258

## 2022-05-25 PROCEDURE — 3700000001 HC ADD 15 MINUTES (ANESTHESIA): Performed by: INTERNAL MEDICINE

## 2022-05-25 PROCEDURE — 6370000000 HC RX 637 (ALT 250 FOR IP): Performed by: INTERNAL MEDICINE

## 2022-05-25 PROCEDURE — 2709999900 HC NON-CHARGEABLE SUPPLY: Performed by: INTERNAL MEDICINE

## 2022-05-25 PROCEDURE — 2500000003 HC RX 250 WO HCPCS: Performed by: NURSE ANESTHETIST, CERTIFIED REGISTERED

## 2022-05-25 PROCEDURE — 2580000003 HC RX 258: Performed by: INTERNAL MEDICINE

## 2022-05-25 PROCEDURE — 3609027000 HC COLONOSCOPY: Performed by: INTERNAL MEDICINE

## 2022-05-25 PROCEDURE — 6360000002 HC RX W HCPCS: Performed by: NURSE ANESTHETIST, CERTIFIED REGISTERED

## 2022-05-25 PROCEDURE — 88305 TISSUE EXAM BY PATHOLOGIST: CPT

## 2022-05-25 RX ORDER — SODIUM CHLORIDE 0.9 % (FLUSH) 0.9 %
5-40 SYRINGE (ML) INJECTION EVERY 12 HOURS SCHEDULED
Status: DISCONTINUED | OUTPATIENT
Start: 2022-05-25 | End: 2022-05-25 | Stop reason: HOSPADM

## 2022-05-25 RX ORDER — MAGNESIUM HYDROXIDE 1200 MG/15ML
LIQUID ORAL PRN
Status: DISCONTINUED | OUTPATIENT
Start: 2022-05-25 | End: 2022-05-25 | Stop reason: ALTCHOICE

## 2022-05-25 RX ORDER — LIDOCAINE HYDROCHLORIDE 20 MG/ML
INJECTION, SOLUTION INFILTRATION; PERINEURAL PRN
Status: DISCONTINUED | OUTPATIENT
Start: 2022-05-25 | End: 2022-05-25 | Stop reason: SDUPTHER

## 2022-05-25 RX ORDER — SODIUM CHLORIDE 9 MG/ML
INJECTION, SOLUTION INTRAVENOUS CONTINUOUS
Status: DISCONTINUED | OUTPATIENT
Start: 2022-05-25 | End: 2022-05-25 | Stop reason: HOSPADM

## 2022-05-25 RX ORDER — SIMETHICONE 20 MG/.3ML
EMULSION ORAL PRN
Status: DISCONTINUED | OUTPATIENT
Start: 2022-05-25 | End: 2022-05-25 | Stop reason: ALTCHOICE

## 2022-05-25 RX ORDER — SODIUM CHLORIDE 9 MG/ML
INJECTION, SOLUTION INTRAVENOUS
Status: COMPLETED
Start: 2022-05-25 | End: 2022-05-25

## 2022-05-25 RX ORDER — SODIUM CHLORIDE 0.9 % (FLUSH) 0.9 %
5-40 SYRINGE (ML) INJECTION PRN
Status: DISCONTINUED | OUTPATIENT
Start: 2022-05-25 | End: 2022-05-25 | Stop reason: HOSPADM

## 2022-05-25 RX ORDER — SODIUM CHLORIDE 9 MG/ML
INJECTION, SOLUTION INTRAVENOUS PRN
Status: DISCONTINUED | OUTPATIENT
Start: 2022-05-25 | End: 2022-05-25 | Stop reason: HOSPADM

## 2022-05-25 RX ORDER — PROPOFOL 10 MG/ML
INJECTION, EMULSION INTRAVENOUS PRN
Status: DISCONTINUED | OUTPATIENT
Start: 2022-05-25 | End: 2022-05-25 | Stop reason: SDUPTHER

## 2022-05-25 RX ADMIN — PHENYLEPHRINE HYDROCHLORIDE 50 MCG: 10 INJECTION INTRAVENOUS at 09:54

## 2022-05-25 RX ADMIN — SODIUM CHLORIDE: 9 INJECTION, SOLUTION INTRAVENOUS at 09:34

## 2022-05-25 RX ADMIN — PROPOFOL 60 MG: 10 INJECTION, EMULSION INTRAVENOUS at 09:44

## 2022-05-25 RX ADMIN — SODIUM CHLORIDE: 9 INJECTION, SOLUTION INTRAVENOUS at 09:35

## 2022-05-25 RX ADMIN — PHENYLEPHRINE HYDROCHLORIDE 100 MCG: 10 INJECTION INTRAVENOUS at 09:58

## 2022-05-25 RX ADMIN — LIDOCAINE HYDROCHLORIDE 20 MG: 20 INJECTION, SOLUTION INFILTRATION; PERINEURAL at 09:43

## 2022-05-25 RX ADMIN — PROPOFOL 250 MG: 10 INJECTION, EMULSION INTRAVENOUS at 09:45

## 2022-05-25 ASSESSMENT — PAIN - FUNCTIONAL ASSESSMENT: PAIN_FUNCTIONAL_ASSESSMENT: 0-10

## 2022-05-25 NOTE — ANESTHESIA PRE PROCEDURE
Department of Anesthesiology  Preprocedure Note       Name:  Fariba Flannery   Age:  70 y.o.  :  1950                                          MRN:  [id number]         Date:  2022      Surgeon: Inés Walls):  Kenji Chavez MD    Procedure: Procedure(s):  COLORECTAL CANCER SCREENING, HIGH RISK    Medications prior to admission:   Prior to Admission medications    Medication Sig Start Date End Date Taking? Authorizing Provider   Fexofenadine-Pseudoephedrine (ALLEGRA-D 12 HOUR PO) Take by mouth    Historical Provider, MD       Current medications:    No current facility-administered medications for this encounter. Allergies: Allergies   Allergen Reactions    Aspirin     Codeine     Salicylates Other (See Comments)       Problem List:    Patient Active Problem List   Diagnosis Code    Closed fracture of distal end of right radius with malunion S52.501P       Past Medical History:        Diagnosis Date    Hearing loss        Past Surgical History:        Procedure Laterality Date    OPEN TREATMENT RADIAL SHAFT FRACTURE Right 2018    RIGHT WRIST ORIF DISTAL RADIUS performed by Bogdan Trujillo MD at [place] [address] History:    Social History     Tobacco Use    Smoking status: Never Smoker    Smokeless tobacco: Never Used   Substance Use Topics    Alcohol use: Never     Comment: occasional                                Counseling given: Not Answered      Vital Signs (Current): There were no vitals filed for this visit.                                            BP Readings from Last 3 Encounters:   22 102/64   10/06/21 116/64   21 112/64       NPO Status:                                                                                 BMI:   Wt Readings from Last 3 Encounters:   22 126 lb (57.2 kg)   10/06/21 125 lb 3.2 oz (56.8 kg)   21 124 lb (56.2 kg)     There is no height or weight on file to calculate BMI.    CBC:   Lab Results   Component Value Date    WBC 6.0 10/23/2021    RBC 3.97 10/23/2021    HGB 13.1 10/23/2021    HCT 38.2 10/23/2021    MCV 96.2 10/23/2021    RDW 14.8 10/23/2021     10/23/2021       CMP:   Lab Results   Component Value Date     04/20/2022    K 4.9 04/20/2022     04/20/2022    CO2 23 04/20/2022    BUN 12 04/20/2022    CREATININE 0.59 04/20/2022    GFRAA >60.0 04/20/2022    LABGLOM >60.0 04/20/2022    GLUCOSE 94 04/20/2022    PROT 7.7 04/20/2022    CALCIUM 9.6 04/20/2022    BILITOT <0.2 04/20/2022    ALKPHOS 94 04/20/2022    AST 20 04/20/2022    ALT 13 04/20/2022       POC Tests: No results for input(s): POCGLU, POCNA, POCK, POCCL, POCBUN, POCHEMO, POCHCT in the last 72 hours. Coags: No results found for: PROTIME, INR, APTT    HCG (If Applicable): No results found for: PREGTESTUR, PREGSERUM, HCG, HCGQUANT     ABGs: No results found for: PHART, PO2ART, EOL0BRV, VQM3HCT, BEART, V9JHOIGN     Type & Screen (If Applicable):  No results found for: LABABO, LABRH    Drug/Infectious Status (If Applicable):  Lab Results   Component Value Date    HEPCAB NONREACTIVE 04/20/2022       COVID-19 Screening (If Applicable): No results found for: COVID19        Anesthesia Evaluation  Patient summary reviewed and Nursing notes reviewed no history of anesthetic complications:   Airway: Mallampati: II  TM distance: >3 FB   Neck ROM: full  Mouth opening: > = 3 FB   Dental: normal exam         Pulmonary:Negative Pulmonary ROS and normal exam                               Cardiovascular:Negative CV ROS  Exercise tolerance: good (>4 METS),                     Neuro/Psych:   Negative Neuro/Psych ROS              GI/Hepatic/Renal: Neg GI/Hepatic/Renal ROS            Endo/Other: Negative Endo/Other ROS                    Abdominal:             Vascular: negative vascular ROS. Other Findings:           Anesthesia Plan      MAC     ASA 2       Induction: intravenous. Anesthetic plan and risks discussed with patient.     Use of blood products discussed with patient whom. Plan discussed with CRNA.                     SHWETA Joya - CRNA   5/25/2022

## 2022-05-25 NOTE — H&P
Patient Name: Luiz Acharya  : 1950  MRN: [id number]  DATE: 22      ENDOSCOPY  History and Physical    Procedure:    [] Diagnostic Colonoscopy       [x] Screening Colonoscopy  [] EGD      [] ERCP      [] EUS       [] Other    [x] Previous office notes/History and Physical reviewed from the patients chart. Please see EMR for further details of HPI. I have examined the patient's status immediately prior to the procedure and:      Indications/HPI:    []Abdominal Pain   []Cancer- GI/Lung  []Fhx of colon CA/polyps  []History of Polyps   []Retanas   []Melena  []Abnormal Imaging   []Dysphagia    []Persistent Pneumonia  []Anemia   []Food Impaction  []History of Polyps  []GI Bleed   []Pulmonary nodule/Mass  []Change in bowel habits  []Heartburn/Reflux  []Rectal Bleed (BRBPR)  []Chest Pain - Non Cardiac  []Heme (+) Stool  []Ulcers  []Constipation   []Hemoptysis   []Varices  []Diarrhea   []Hypoxemia  []Nausea/Vomiting   [x]Screening   []Crohns/Colitis  []Other:    Anesthesia:   [x] MAC [] Moderate Sedation   [] General   [] None     ROS: 12 pt Review of Symptoms was negative unless mentioned above    Medications:   Prior to Admission medications    Medication Sig Start Date End Date Taking? Authorizing Provider   Fexofenadine-Pseudoephedrine (ALLEGRA-D 12 HOUR PO) Take by mouth    Historical Provider, MD       Allergies:    Allergies   Allergen Reactions    Aspirin     Codeine     Salicylates Other (See Comments)        History of allergic reaction to anesthesia:  No    Past Medical History:  Past Medical History:   Diagnosis Date    Hearing loss        Past Surgical History:  Past Surgical History:   Procedure Laterality Date    OPEN TREATMENT RADIAL SHAFT FRACTURE Right 2018    RIGHT WRIST ORIF DISTAL RADIUS performed by Jennifer Tovar MD at [place] [address] History:  Social History     Tobacco Use    Smoking status: Never Smoker    Smokeless tobacco: Never Used   Vaping Use    Vaping Use: Not on file   Substance Use Topics    Alcohol use: Never     Comment: occasional    Drug use: No       Vital Signs: There were no vitals filed for this visit. Physical Exam:  Cardiac:  [x]WNL  []Comments:  Pulmonary:  [x]WNL   []Comments:   Neuro/Mental Status:  [x]WNL  []Comments:  Abdominal:  [x]WNL    []Comments:  Other:   []WNL  []Comments:    Informed Consent:  The risks and benefits of the procedure have been discussed with either the patient or if they cannot consent, their representative. Assessment:  Patient examined and appropriate for planned sedation and procedure. Plan:  Proceed with planned sedation and procedure as above.     Clearance MD Rohit  9:13 AM

## 2022-05-25 NOTE — ANESTHESIA POSTPROCEDURE EVALUATION
Department of Anesthesiology  Postprocedure Note    Patient: Yamile Canales  MRN: [id number]  YOB: 1950  Date of evaluation: 5/25/2022  Time:  10:03 AM     Procedure Summary     Date: 05/25/22 Room / Location: [facility] OR [address] [place] [address]    Anesthesia Start: 0409 Anesthesia Stop: 6314    Procedure: COLORECTAL CANCER SCREENING, HIGH RISK (N/A ) Diagnosis: (Screening, hx polyps)    Surgeons: Edvin Jones MD Responsible Provider: SHWETA Lopez CRNA    Anesthesia Type: MAC ASA Status: 2          Anesthesia Type: MAC    Maegan Phase I: Maegan Score: 10    Maegan Phase II:      Last vitals: Reviewed and per EMR flowsheets.        Anesthesia Post Evaluation    Patient location during evaluation: bedside  Patient participation: complete - patient participated  Level of consciousness: awake and awake and alert  Pain score: 0  Airway patency: patent  Nausea & Vomiting: no nausea and no vomiting  Complications: no  Cardiovascular status: blood pressure returned to baseline and hemodynamically stable  Respiratory status: acceptable and face mask  Hydration status: euvolemic

## 2022-05-28 NOTE — RESULT ENCOUNTER NOTE
5/28/2022  Temitope Matthews  [address], [place] [address]    Dear Temitope Matthews,    Your colonoscopy reveled a growth on the large intestine (Colon) called a polyp. A polyp was removed during your procedure. As instructed after your colonoscopy, recommendations suggest a follow up colonoscopy in 3 years    We hope you are doing well, and if you have any questions please contact me at [phone]. Thank you for choosing [facility] for your healthcare.     Sincerely,     Yani Fuentes MD

## 2022-06-01 ENCOUNTER — HOSPITAL ENCOUNTER (OUTPATIENT)
Dept: WOMENS IMAGING | Age: 72
Discharge: HOME | End: 2022-06-03
Payer: MEDICARE

## 2022-06-01 DIAGNOSIS — Z12.31 ENCOUNTER FOR SCREENING MAMMOGRAM FOR MALIGNANT NEOPLASM OF BREAST: ICD-10-CM

## 2022-06-01 PROCEDURE — 77063 BREAST TOMOSYNTHESIS BI: CPT

## 2025-07-22 ENCOUNTER — HOSPITAL ENCOUNTER (EMERGENCY)
Age: 75
Discharge: HOME | End: 2025-07-22
Payer: MEDICARE

## 2025-07-22 VITALS
WEIGHT: 124.4 LBS | RESPIRATION RATE: 18 BRPM | HEART RATE: 67 BPM | TEMPERATURE: 98 F | DIASTOLIC BLOOD PRESSURE: 71 MMHG | BODY MASS INDEX: 23.49 KG/M2 | SYSTOLIC BLOOD PRESSURE: 127 MMHG | HEIGHT: 61 IN | OXYGEN SATURATION: 97 %

## 2025-07-22 DIAGNOSIS — K08.89 PAIN, DENTAL: Primary | ICD-10-CM

## 2025-07-22 DIAGNOSIS — R22.0 GINGIVAL SWELLING: ICD-10-CM

## 2025-07-22 PROCEDURE — 99282 EMERGENCY DEPT VISIT SF MDM: CPT

## 2025-07-22 ASSESSMENT — ENCOUNTER SYMPTOMS
VOMITING: 0
TROUBLE SWALLOWING: 0
DIARRHEA: 0
SHORTNESS OF BREATH: 0
PHOTOPHOBIA: 0
NAUSEA: 0
ABDOMINAL PAIN: 0
COUGH: 0
VOICE CHANGE: 0

## 2025-07-22 ASSESSMENT — PAIN SCALES - GENERAL: PAINLEVEL_OUTOF10: 7

## 2025-07-22 ASSESSMENT — PAIN DESCRIPTION - ORIENTATION: ORIENTATION: LEFT

## 2025-07-22 ASSESSMENT — PAIN DESCRIPTION - LOCATION: LOCATION: FACE

## 2025-07-22 ASSESSMENT — PAIN - FUNCTIONAL ASSESSMENT: PAIN_FUNCTIONAL_ASSESSMENT: 0-10

## 2025-07-22 ASSESSMENT — LIFESTYLE VARIABLES
HOW OFTEN DO YOU HAVE A DRINK CONTAINING ALCOHOL: NEVER
HOW MANY STANDARD DRINKS CONTAINING ALCOHOL DO YOU HAVE ON A TYPICAL DAY: PATIENT DOES NOT DRINK

## 2025-07-22 NOTE — ED TRIAGE NOTES
Pt went to the dentist yesterday   Left side of face is swollen   Pt is on pcn and a tylenol 3  Pt states pain is a 7/10  Pt is afebrile

## 2025-07-22 NOTE — ED PROVIDER NOTES
MRI are read by the radiologist. Plain radiographic images are visualized and preliminarily interpreted by the emergency physician with the below findings:        Interpretation per the Radiologist below, if available at the time of this note:    No orders to display         ED BEDSIDE ULTRASOUND:   Performed by ED Physician - none    LABS:  Labs Reviewed - No data to display    All other labs were within normal range or not returned as of this dictation.    EMERGENCY DEPARTMENT COURSE and DIFFERENTIAL DIAGNOSIS/MDM:   Vitals:    Vitals:    07/22/25 1049   BP: 127/71   Pulse: 67   Resp: 18   Temp: 98 °F (36.7 °C)   TempSrc: Temporal   SpO2: 97%   Weight: 56.4 kg (124 lb 6.4 oz)   Height: 1.549 m (5' 1\")                Medical Decision Making    75-year-old female patient presenting to the ED for evaluation of left-sided gum and adjacent facial swelling.  States associated pain.  This started several days ago.  Was seen by dentist yesterday and started on antibiotic as well as Tylenol 3.  Has taken 1 day of the antibiotic, penicillin V.  Denies any worsening of the swelling.  States it is just fail to go down.  Patient presents afebrile.  HDS.  Patient is nontoxic and in no acute distress.  Patient has a moist oropharynx.  Patient has no submental or submandibular swelling.  No evidence of periapical or facial abscesses or cellulitis.  She has no drooling.  No trismus.  Oropharynx is clear and moist.  Uvula is midline.  Patient does have evidence of gingival swelling in the left lower side with adjacent, small area of soft tissue swelling to the left lower cheek.  Reviewed with patient and family that there is no indication for labs or imaging at this time, no indication to change antibiotic, no indication for IV antibiotic.  Discussed that she will likely need more time with antibiotic for her dental problem to improve, also advised to utilize anti-inflammatory medicine and ice.  Discussed specific precautions for

## 2025-07-22 NOTE — DISCHARGE INSTRUCTIONS
We discussed today that you have soft tissue swelling in your gingiva (gums) and to the left side of your face.  We discussed that there is no evidence of obvious fractured tooth, bacterial infection, or abscess that would require drainage.  We discussed that there is no malocclusion or palpable deformity to your jaw.  We discussed that you sustained a fall several days ago but had struck the right side of your face.  We discussed that you were started on antibiotic by your dentist yesterday and have also been using Tylenol with codeine.  We discussed that ice as well as Motrin/ibuprofen may help with your swelling little bit more.  We discussed based on your examination today there is no indication for lab work or imaging.  Your examination does not appear consistent with a significant abscess, cellulitis, or bacterial infection of the neck or the face, does not appear consistent with a facial fracture.  You should follow-up routinely with PCP and dentist.  Please continue to take your antibiotic as prescribed.  Should your facial swelling worsen or fail to improve after 3 to 4 days of antibiotic, should you have difficulty swallowing, speaking, or controlling your own saliva, you should return to the ED.

## (undated) DEVICE — SUTURE VCRL SZ 0 L36IN ABSRB UD L36MM CT-1 1/2 CIR J946H

## (undated) DEVICE — SYRINGE BLB 50CC IRRIG PLIABLE FNGR FLNG GRAD FLSK DISP

## (undated) DEVICE — DRAPE C ARM W48XL84IN POLYETH DISPOSABLE FOR 6600 6800

## (undated) DEVICE — TUBING, SUCTION, 1/4" X 10', STRAIGHT: Brand: MEDLINE

## (undated) DEVICE — Device

## (undated) DEVICE — BANDAGE COMPR W4INXL5YD BGE HI E W/ REM CLP SURE-WRAP

## (undated) DEVICE — PATIENT RETURN ELECTRODE, SINGLE-USE, NON CONTACT QUALITY MONITORING, ADULT, WITH 9 FT (2.7 M) CORD, FOR PATIENTS WEIGHING OVER 33LBS. (15KG): Brand: MEGADYNE

## (undated) DEVICE — 2000CC GUARDIAN II: Brand: GUARDIAN

## (undated) DEVICE — MEDI-VAC NON-CONDUCTIVE SUCTION TUBING: Brand: CARDINAL HEALTH

## (undated) DEVICE — ENDO CARRY-ON PROCEDURE KIT: Brand: ENDO CARRY-ON PROCEDURE KIT

## (undated) DEVICE — 2.3MM X 24MM NON-TOGGLING CORTICAL SCREW
Type: IMPLANTABLE DEVICE | Site: WRIST | Status: NON-FUNCTIONAL
Brand: ACUMED
Removed: 2018-01-08

## (undated) DEVICE — HAND II: Brand: MEDLINE INDUSTRIES, INC.

## (undated) DEVICE — PENCIL ES L3M BTTN SWCH HOLSTER W/ BLDE ELECTRD EDGE

## (undated) DEVICE — GAUZE SPONGES,12 PLY: Brand: CURITY

## (undated) DEVICE — 2.3MM X 14MM LOCKING CORTICAL SCREW
Type: IMPLANTABLE DEVICE | Site: WRIST | Status: NON-FUNCTIONAL
Brand: ACUMED
Removed: 2018-01-08

## (undated) DEVICE — 3.5MM X 12MM NON-LOCKING HEXALOBE SCREW
Type: IMPLANTABLE DEVICE | Site: WRIST | Status: NON-FUNCTIONAL
Brand: ACUMED
Removed: 2018-01-08

## (undated) DEVICE — CHLORAPREP 26ML ORANGE

## (undated) DEVICE — SNARE ENDOSCP AD L240CM LOOP W10MM SHTH DIA2.4MM RND INSUL

## (undated) DEVICE — .054" X 6" GUIDE WIRE: Brand: ACUMED

## (undated) DEVICE — PAD,ABDOMINAL,8"X10",ST,LF: Brand: MEDLINE

## (undated) DEVICE — GLOVE ORANGE PI 8   MSG9080

## (undated) DEVICE — DRESSING GZ W1XL8IN COT XRFRM N ADH OVERWRAP CURAD

## (undated) DEVICE — Z DISCONTINUED USE 2275686 GLOVE SURG SZ 8 L12IN FNGR THK13MIL WHT ISOLEX POLYISOPRENE

## (undated) DEVICE — Device: Brand: ENDO SMARTCAP

## (undated) DEVICE — TUBE SET 96 MM 64 MM H2O PERISTALTIC STD AUX CHANNEL

## (undated) DEVICE — GLOVE SURG SZ 7 L12IN FNGR THK94MIL TRNSLUC YEL LTX HYDRGEL

## (undated) DEVICE — SINGLE PORT MANIFOLD: Brand: NEPTUNE 2

## (undated) DEVICE — 2.3MM X 14MM NON-TOGGLING CORTICAL SCREW
Type: IMPLANTABLE DEVICE | Site: WRIST | Status: NON-FUNCTIONAL
Brand: ACUMED
Removed: 2018-01-08

## (undated) DEVICE — 1010 S-DRAPE TOWEL DRAPE 10/BX: Brand: STERI-DRAPE™

## (undated) DEVICE — GLOVE SURG SZ 65 THK91MIL LTX FREE SYN POLYISOPRENE

## (undated) DEVICE — 2.3MM X 10MM LOCKING CORTICAL SCREW
Type: IMPLANTABLE DEVICE | Site: WRIST | Status: NON-FUNCTIONAL
Brand: ACUMED
Removed: 2018-01-08

## (undated) DEVICE — BRUSH ENDO CLN L90.5IN SHTH DIA1.7MM BRIST DIA5-7MM 2-6MM

## (undated) DEVICE — GOWN,PREVENTION PLUS,XLN/XL,ST,24/CS: Brand: MEDLINE

## (undated) DEVICE — UNDERCAST PADDING: Brand: DEROYAL

## (undated) DEVICE — ELECTRODE PT RET AD L9FT HI MOIST COND ADH HYDRGEL CORDED

## (undated) DEVICE — INTENDED FOR TISSUE SEPARATION, AND OTHER PROCEDURES THAT REQUIRE A SHARP SURGICAL BLADE TO PUNCTURE OR CUT.: Brand: BARD-PARKER ® CARBON RIB-BACK BLADES

## (undated) DEVICE — SUTURE MCRYL SZ 3-0 L27IN ABSRB UD L19MM PS-2 3/8 CIR PRIM Y427H